# Patient Record
Sex: MALE | NOT HISPANIC OR LATINO | Employment: OTHER | URBAN - METROPOLITAN AREA
[De-identification: names, ages, dates, MRNs, and addresses within clinical notes are randomized per-mention and may not be internally consistent; named-entity substitution may affect disease eponyms.]

---

## 2017-01-24 ENCOUNTER — COMMUNICATION - HEALTHEAST (OUTPATIENT)
Dept: FAMILY MEDICINE | Facility: CLINIC | Age: 67
End: 2017-01-24

## 2017-01-24 DIAGNOSIS — C61 PROSTATE CANCER (H): ICD-10-CM

## 2017-02-01 ENCOUNTER — AMBULATORY - HEALTHEAST (OUTPATIENT)
Dept: LAB | Facility: CLINIC | Age: 67
End: 2017-02-01

## 2017-02-01 DIAGNOSIS — C61 PROSTATE CANCER (H): ICD-10-CM

## 2017-02-01 LAB — PSA SERPL-MCNC: 0.2 NG/ML (ref 0–4.5)

## 2017-02-02 ENCOUNTER — COMMUNICATION - HEALTHEAST (OUTPATIENT)
Dept: FAMILY MEDICINE | Facility: CLINIC | Age: 67
End: 2017-02-02

## 2017-02-02 ENCOUNTER — AMBULATORY - HEALTHEAST (OUTPATIENT)
Dept: FAMILY MEDICINE | Facility: CLINIC | Age: 67
End: 2017-02-02

## 2017-02-02 DIAGNOSIS — C61 PROSTATE CANCER (H): ICD-10-CM

## 2017-02-20 ENCOUNTER — AMBULATORY - HEALTHEAST (OUTPATIENT)
Dept: LAB | Facility: CLINIC | Age: 67
End: 2017-02-20

## 2017-02-20 DIAGNOSIS — C61 PROSTATE CANCER (H): ICD-10-CM

## 2017-02-20 LAB — PSA SERPL-MCNC: 0.2 NG/ML (ref 0–4.5)

## 2017-02-21 ENCOUNTER — COMMUNICATION - HEALTHEAST (OUTPATIENT)
Dept: FAMILY MEDICINE | Facility: CLINIC | Age: 67
End: 2017-02-21

## 2017-02-22 ENCOUNTER — OFFICE VISIT - HEALTHEAST (OUTPATIENT)
Dept: FAMILY MEDICINE | Facility: CLINIC | Age: 67
End: 2017-02-22

## 2017-02-22 DIAGNOSIS — R53.83 FATIGUE: ICD-10-CM

## 2017-02-22 DIAGNOSIS — G47.33 OSA ON CPAP: ICD-10-CM

## 2017-02-22 DIAGNOSIS — Z00.00 HEALTH CARE MAINTENANCE: ICD-10-CM

## 2017-02-23 ENCOUNTER — COMMUNICATION - HEALTHEAST (OUTPATIENT)
Dept: FAMILY MEDICINE | Facility: CLINIC | Age: 67
End: 2017-02-23

## 2017-03-13 ENCOUNTER — COMMUNICATION - HEALTHEAST (OUTPATIENT)
Dept: FAMILY MEDICINE | Facility: CLINIC | Age: 67
End: 2017-03-13

## 2017-03-13 ENCOUNTER — AMBULATORY - HEALTHEAST (OUTPATIENT)
Dept: FAMILY MEDICINE | Facility: CLINIC | Age: 67
End: 2017-03-13

## 2017-03-13 DIAGNOSIS — C61 PROSTATE CANCER (H): ICD-10-CM

## 2017-03-14 ENCOUNTER — AMBULATORY - HEALTHEAST (OUTPATIENT)
Dept: LAB | Facility: CLINIC | Age: 67
End: 2017-03-14

## 2017-03-14 DIAGNOSIS — C61 PROSTATE CANCER (H): ICD-10-CM

## 2017-03-14 LAB — PSA SERPL-MCNC: 0.2 NG/ML (ref 0–4.5)

## 2017-03-15 ENCOUNTER — COMMUNICATION - HEALTHEAST (OUTPATIENT)
Dept: FAMILY MEDICINE | Facility: CLINIC | Age: 67
End: 2017-03-15

## 2017-04-25 ENCOUNTER — COMMUNICATION - HEALTHEAST (OUTPATIENT)
Dept: FAMILY MEDICINE | Facility: CLINIC | Age: 67
End: 2017-04-25

## 2017-04-26 ENCOUNTER — AMBULATORY - HEALTHEAST (OUTPATIENT)
Dept: FAMILY MEDICINE | Facility: CLINIC | Age: 67
End: 2017-04-26

## 2017-04-26 DIAGNOSIS — C61 PROSTATE CANCER (H): ICD-10-CM

## 2017-05-01 ENCOUNTER — AMBULATORY - HEALTHEAST (OUTPATIENT)
Dept: LAB | Facility: CLINIC | Age: 67
End: 2017-05-01

## 2017-05-01 DIAGNOSIS — C61 PROSTATE CANCER (H): ICD-10-CM

## 2017-05-01 LAB — PSA SERPL-MCNC: 0.1 NG/ML (ref 0–4.5)

## 2017-05-02 ENCOUNTER — COMMUNICATION - HEALTHEAST (OUTPATIENT)
Dept: FAMILY MEDICINE | Facility: CLINIC | Age: 67
End: 2017-05-02

## 2017-05-03 ENCOUNTER — AMBULATORY - HEALTHEAST (OUTPATIENT)
Dept: FAMILY MEDICINE | Facility: CLINIC | Age: 67
End: 2017-05-03

## 2017-05-03 DIAGNOSIS — C61 PROSTATE CANCER (H): ICD-10-CM

## 2017-06-19 ENCOUNTER — AMBULATORY - HEALTHEAST (OUTPATIENT)
Dept: LAB | Facility: CLINIC | Age: 67
End: 2017-06-19

## 2017-06-19 DIAGNOSIS — C61 PROSTATE CANCER (H): ICD-10-CM

## 2017-06-19 LAB — PSA SERPL-MCNC: 0.1 NG/ML (ref 0–4.5)

## 2017-06-20 ENCOUNTER — COMMUNICATION - HEALTHEAST (OUTPATIENT)
Dept: FAMILY MEDICINE | Facility: CLINIC | Age: 67
End: 2017-06-20

## 2017-07-19 ENCOUNTER — OFFICE VISIT - HEALTHEAST (OUTPATIENT)
Dept: FAMILY MEDICINE | Facility: CLINIC | Age: 67
End: 2017-07-19

## 2017-07-19 ENCOUNTER — COMMUNICATION - HEALTHEAST (OUTPATIENT)
Dept: FAMILY MEDICINE | Facility: CLINIC | Age: 67
End: 2017-07-19

## 2017-07-19 DIAGNOSIS — R10.32 LLQ ABDOMINAL PAIN: ICD-10-CM

## 2017-07-19 DIAGNOSIS — K57.92 DIVERTICULITIS: ICD-10-CM

## 2017-07-19 DIAGNOSIS — C61 PROSTATE CANCER (H): ICD-10-CM

## 2017-07-19 LAB — PSA SERPL-MCNC: 0.1 NG/ML (ref 0–4.5)

## 2017-07-20 ENCOUNTER — COMMUNICATION - HEALTHEAST (OUTPATIENT)
Dept: FAMILY MEDICINE | Facility: CLINIC | Age: 67
End: 2017-07-20

## 2017-08-01 ENCOUNTER — COMMUNICATION - HEALTHEAST (OUTPATIENT)
Dept: FAMILY MEDICINE | Facility: CLINIC | Age: 67
End: 2017-08-01

## 2017-08-09 ENCOUNTER — OFFICE VISIT - HEALTHEAST (OUTPATIENT)
Dept: FAMILY MEDICINE | Facility: CLINIC | Age: 67
End: 2017-08-09

## 2017-08-09 DIAGNOSIS — Z01.818 PREOPERATIVE GENERAL PHYSICAL EXAMINATION: ICD-10-CM

## 2017-08-09 ASSESSMENT — MIFFLIN-ST. JEOR: SCORE: 1679.47

## 2017-08-28 ENCOUNTER — COMMUNICATION - HEALTHEAST (OUTPATIENT)
Dept: FAMILY MEDICINE | Facility: CLINIC | Age: 67
End: 2017-08-28

## 2017-08-28 ENCOUNTER — AMBULATORY - HEALTHEAST (OUTPATIENT)
Dept: LAB | Facility: CLINIC | Age: 67
End: 2017-08-28

## 2017-08-28 DIAGNOSIS — C61 PROSTATE CANCER (H): ICD-10-CM

## 2017-08-28 LAB — PSA SERPL-MCNC: 0.1 NG/ML (ref 0–4.5)

## 2017-10-09 ENCOUNTER — AMBULATORY - HEALTHEAST (OUTPATIENT)
Dept: LAB | Facility: CLINIC | Age: 67
End: 2017-10-09

## 2017-10-09 ENCOUNTER — COMMUNICATION - HEALTHEAST (OUTPATIENT)
Dept: FAMILY MEDICINE | Facility: CLINIC | Age: 67
End: 2017-10-09

## 2017-10-09 DIAGNOSIS — C61 PROSTATE CANCER (H): ICD-10-CM

## 2017-10-09 LAB — PSA SERPL-MCNC: 0.1 NG/ML (ref 0–4.5)

## 2017-10-13 ENCOUNTER — COMMUNICATION - HEALTHEAST (OUTPATIENT)
Dept: LAB | Facility: CLINIC | Age: 67
End: 2017-10-13

## 2017-12-29 ENCOUNTER — AMBULATORY - HEALTHEAST (OUTPATIENT)
Dept: LAB | Facility: CLINIC | Age: 67
End: 2017-12-29

## 2017-12-29 DIAGNOSIS — C61 PROSTATE CANCER (H): ICD-10-CM

## 2017-12-29 LAB — PSA SERPL-MCNC: <0.1 NG/ML (ref 0–4.5)

## 2017-12-30 ENCOUNTER — COMMUNICATION - HEALTHEAST (OUTPATIENT)
Dept: FAMILY MEDICINE | Facility: CLINIC | Age: 67
End: 2017-12-30

## 2018-01-29 ENCOUNTER — AMBULATORY - HEALTHEAST (OUTPATIENT)
Dept: FAMILY MEDICINE | Facility: CLINIC | Age: 68
End: 2018-01-29

## 2018-01-29 DIAGNOSIS — N52.9 ED (ERECTILE DYSFUNCTION): ICD-10-CM

## 2018-03-04 ENCOUNTER — AMBULATORY - HEALTHEAST (OUTPATIENT)
Dept: FAMILY MEDICINE | Facility: CLINIC | Age: 68
End: 2018-03-04

## 2018-03-04 ENCOUNTER — COMMUNICATION - HEALTHEAST (OUTPATIENT)
Dept: FAMILY MEDICINE | Facility: CLINIC | Age: 68
End: 2018-03-04

## 2018-03-04 DIAGNOSIS — N52.9 ED (ERECTILE DYSFUNCTION): ICD-10-CM

## 2018-03-19 ENCOUNTER — COMMUNICATION - HEALTHEAST (OUTPATIENT)
Dept: FAMILY MEDICINE | Facility: CLINIC | Age: 68
End: 2018-03-19

## 2018-03-19 DIAGNOSIS — N52.9 ED (ERECTILE DYSFUNCTION): ICD-10-CM

## 2018-03-21 ENCOUNTER — AMBULATORY - HEALTHEAST (OUTPATIENT)
Dept: LAB | Facility: CLINIC | Age: 68
End: 2018-03-21

## 2018-03-21 DIAGNOSIS — C61 PROSTATE CANCER (H): ICD-10-CM

## 2018-03-21 LAB — PSA SERPL-MCNC: <0.1 NG/ML (ref 0–4.5)

## 2018-03-22 ENCOUNTER — COMMUNICATION - HEALTHEAST (OUTPATIENT)
Dept: FAMILY MEDICINE | Facility: CLINIC | Age: 68
End: 2018-03-22

## 2018-06-25 ENCOUNTER — AMBULATORY - HEALTHEAST (OUTPATIENT)
Dept: LAB | Facility: CLINIC | Age: 68
End: 2018-06-25

## 2018-06-25 ENCOUNTER — COMMUNICATION - HEALTHEAST (OUTPATIENT)
Dept: FAMILY MEDICINE | Facility: CLINIC | Age: 68
End: 2018-06-25

## 2018-06-25 ENCOUNTER — AMBULATORY - HEALTHEAST (OUTPATIENT)
Dept: FAMILY MEDICINE | Facility: CLINIC | Age: 68
End: 2018-06-25

## 2018-06-25 DIAGNOSIS — C61 PROSTATE CANCER (H): ICD-10-CM

## 2018-06-25 LAB — PSA SERPL-MCNC: <0.1 NG/ML (ref 0–4.5)

## 2018-07-23 ENCOUNTER — RECORDS - HEALTHEAST (OUTPATIENT)
Dept: ADMINISTRATIVE | Facility: OTHER | Age: 68
End: 2018-07-23

## 2018-10-15 ENCOUNTER — AMBULATORY - HEALTHEAST (OUTPATIENT)
Dept: LAB | Facility: CLINIC | Age: 68
End: 2018-10-15

## 2018-10-15 DIAGNOSIS — C61 PROSTATE CANCER (H): ICD-10-CM

## 2018-10-15 LAB — PSA SERPL-MCNC: <0.1 NG/ML (ref 0–4.5)

## 2018-10-16 ENCOUNTER — COMMUNICATION - HEALTHEAST (OUTPATIENT)
Dept: FAMILY MEDICINE | Facility: CLINIC | Age: 68
End: 2018-10-16

## 2019-05-06 ENCOUNTER — HOSPITAL ENCOUNTER (OUTPATIENT)
Dept: LAB | Age: 69
Setting detail: SPECIMEN
Discharge: HOME | End: 2019-05-06

## 2019-05-06 ENCOUNTER — AMBULATORY - HEALTHEAST (OUTPATIENT)
Dept: LAB | Facility: CLINIC | Age: 69
End: 2019-05-06

## 2019-05-06 ENCOUNTER — COMMUNICATION - HEALTHEAST (OUTPATIENT)
Dept: FAMILY MEDICINE | Facility: CLINIC | Age: 69
End: 2019-05-06

## 2019-05-06 DIAGNOSIS — C61 PROSTATE CANCER (H): ICD-10-CM

## 2019-05-06 LAB — PSA SERPL-MCNC: <0.1 NG/ML (ref 0–4.5)

## 2019-06-03 ENCOUNTER — OFFICE VISIT - HEALTHEAST (OUTPATIENT)
Dept: FAMILY MEDICINE | Facility: CLINIC | Age: 69
End: 2019-06-03

## 2019-06-03 DIAGNOSIS — Z00.00 HEALTH CARE MAINTENANCE: ICD-10-CM

## 2019-06-03 DIAGNOSIS — K21.00 REFLUX ESOPHAGITIS: ICD-10-CM

## 2019-06-03 DIAGNOSIS — N52.9 ERECTILE DYSFUNCTION, UNSPECIFIED ERECTILE DYSFUNCTION TYPE: ICD-10-CM

## 2019-06-03 ASSESSMENT — MIFFLIN-ST. JEOR: SCORE: 1678.9

## 2019-06-10 ENCOUNTER — RECORDS - HEALTHEAST (OUTPATIENT)
Dept: ADMINISTRATIVE | Facility: OTHER | Age: 69
End: 2019-06-10

## 2019-06-18 ENCOUNTER — RECORDS - HEALTHEAST (OUTPATIENT)
Dept: ADMINISTRATIVE | Facility: OTHER | Age: 69
End: 2019-06-18

## 2019-10-14 ENCOUNTER — AMBULATORY - HEALTHEAST (OUTPATIENT)
Dept: FAMILY MEDICINE | Facility: CLINIC | Age: 69
End: 2019-10-14

## 2019-10-14 ENCOUNTER — AMBULATORY - HEALTHEAST (OUTPATIENT)
Dept: LAB | Facility: CLINIC | Age: 69
End: 2019-10-14

## 2019-10-14 ENCOUNTER — COMMUNICATION - HEALTHEAST (OUTPATIENT)
Dept: LAB | Facility: CLINIC | Age: 69
End: 2019-10-14

## 2019-10-14 DIAGNOSIS — C61 PROSTATE CANCER (H): ICD-10-CM

## 2019-10-14 DIAGNOSIS — Z12.5 SCREENING FOR PROSTATE CANCER: ICD-10-CM

## 2019-10-14 LAB — PSA SERPL-MCNC: <0.1 NG/ML (ref 0–4.5)

## 2019-10-16 ENCOUNTER — COMMUNICATION - HEALTHEAST (OUTPATIENT)
Dept: FAMILY MEDICINE | Facility: CLINIC | Age: 69
End: 2019-10-16

## 2020-06-03 ENCOUNTER — COMMUNICATION - HEALTHEAST (OUTPATIENT)
Dept: FAMILY MEDICINE | Facility: CLINIC | Age: 70
End: 2020-06-03

## 2020-06-03 DIAGNOSIS — K21.00 REFLUX ESOPHAGITIS: ICD-10-CM

## 2020-06-07 ENCOUNTER — COMMUNICATION - HEALTHEAST (OUTPATIENT)
Dept: FAMILY MEDICINE | Facility: CLINIC | Age: 70
End: 2020-06-07

## 2020-07-20 ENCOUNTER — AMBULATORY - HEALTHEAST (OUTPATIENT)
Dept: FAMILY MEDICINE | Facility: CLINIC | Age: 70
End: 2020-07-20

## 2020-07-20 DIAGNOSIS — G47.33 OSA ON CPAP: ICD-10-CM

## 2020-07-27 ENCOUNTER — COMMUNICATION - HEALTHEAST (OUTPATIENT)
Dept: FAMILY MEDICINE | Facility: CLINIC | Age: 70
End: 2020-07-27

## 2020-07-27 ENCOUNTER — AMBULATORY - HEALTHEAST (OUTPATIENT)
Dept: LAB | Facility: CLINIC | Age: 70
End: 2020-07-27

## 2020-07-27 DIAGNOSIS — C61 PROSTATE CANCER (H): ICD-10-CM

## 2020-07-27 LAB — PSA SERPL-MCNC: <0.1 NG/ML (ref 0–4.5)

## 2020-08-13 ENCOUNTER — COMMUNICATION - HEALTHEAST (OUTPATIENT)
Dept: FAMILY MEDICINE | Facility: CLINIC | Age: 70
End: 2020-08-13

## 2020-08-17 ENCOUNTER — COMMUNICATION - HEALTHEAST (OUTPATIENT)
Dept: SCHEDULING | Facility: CLINIC | Age: 70
End: 2020-08-17

## 2020-08-31 ENCOUNTER — OFFICE VISIT - HEALTHEAST (OUTPATIENT)
Dept: FAMILY MEDICINE | Facility: CLINIC | Age: 70
End: 2020-08-31

## 2020-08-31 DIAGNOSIS — G47.33 OSA ON CPAP: ICD-10-CM

## 2020-09-03 ENCOUNTER — COMMUNICATION - HEALTHEAST (OUTPATIENT)
Dept: FAMILY MEDICINE | Facility: CLINIC | Age: 70
End: 2020-09-03

## 2020-09-03 ENCOUNTER — AMBULATORY - HEALTHEAST (OUTPATIENT)
Dept: FAMILY MEDICINE | Facility: CLINIC | Age: 70
End: 2020-09-03

## 2020-09-03 DIAGNOSIS — N52.9 ERECTILE DYSFUNCTION, UNSPECIFIED ERECTILE DYSFUNCTION TYPE: ICD-10-CM

## 2020-10-15 ENCOUNTER — COMMUNICATION - HEALTHEAST (OUTPATIENT)
Dept: FAMILY MEDICINE | Facility: CLINIC | Age: 70
End: 2020-10-15

## 2020-10-15 DIAGNOSIS — G47.33 OSA ON CPAP: ICD-10-CM

## 2020-10-22 ENCOUNTER — RECORDS - HEALTHEAST (OUTPATIENT)
Dept: ADMINISTRATIVE | Facility: OTHER | Age: 70
End: 2020-10-22

## 2020-11-05 ENCOUNTER — AMBULATORY - HEALTHEAST (OUTPATIENT)
Dept: FAMILY MEDICINE | Facility: CLINIC | Age: 70
End: 2020-11-05

## 2020-11-05 DIAGNOSIS — R25.1 TREMOR OF RIGHT HAND: ICD-10-CM

## 2020-12-14 ENCOUNTER — RECORDS - HEALTHEAST (OUTPATIENT)
Dept: ADMINISTRATIVE | Facility: OTHER | Age: 70
End: 2020-12-14

## 2020-12-14 ENCOUNTER — VIRTUAL VISIT (OUTPATIENT)
Dept: NEUROLOGY | Facility: CLINIC | Age: 70
End: 2020-12-14
Payer: COMMERCIAL

## 2020-12-14 VITALS — HEIGHT: 70 IN | WEIGHT: 198 LBS | BODY MASS INDEX: 28.35 KG/M2

## 2020-12-14 DIAGNOSIS — G25.0 BENIGN ESSENTIAL TREMOR: Primary | ICD-10-CM

## 2020-12-14 DIAGNOSIS — I10 ESSENTIAL HYPERTENSION: ICD-10-CM

## 2020-12-14 PROCEDURE — 99204 OFFICE O/P NEW MOD 45 MIN: CPT | Mod: 95 | Performed by: PSYCHIATRY & NEUROLOGY

## 2020-12-14 SDOH — HEALTH STABILITY: MENTAL HEALTH: HOW OFTEN DO YOU HAVE A DRINK CONTAINING ALCOHOL?: 2-3 TIMES A WEEK

## 2020-12-14 SDOH — HEALTH STABILITY: MENTAL HEALTH: HOW OFTEN DO YOU HAVE 6 OR MORE DRINKS ON ONE OCCASION?: NOT ASKED

## 2020-12-14 SDOH — HEALTH STABILITY: MENTAL HEALTH: HOW MANY STANDARD DRINKS CONTAINING ALCOHOL DO YOU HAVE ON A TYPICAL DAY?: NOT ASKED

## 2020-12-14 ASSESSMENT — MIFFLIN-ST. JEOR: SCORE: 1669.37

## 2020-12-14 NOTE — LETTER
12/14/2020         RE: Zachariah Wilkerson Jr.  [address] [facility] [address]        Dear Colleague,    Thank you for referring your patient, Zachariah Wilkerson Jr., to the [facility] [place]. Please see a copy of my visit note below.        NEUROLOGY NOTE        Assessment/Plan            Essential tremor    Hypertension    Plan:  Follow-up in 6 months  Reduce stress.  Avoid sickness and sleeping well helpful to reduce the tremor.        This is a virtual visit due to COVID-19 Pandemic to mitigate potential disease spreading. Consent obtained for charge with this visit.  Chart reviewed.  Discussed about diagnosis and differentials.  Discussed about various factors or tracks to reduce tremors.  Discussed about treatment options including surgical interventions.  Total time spent about 30 minutes.       SUBJECTIVE       Zachariah Wilkerson Jr. is a 70 year old male seen for tremor.  Right handed.     Noted right hand tremor starting 2015, but in the last couple of years may have a couple of times at work when using a drill with right hand, between thumb and index finger there is significant tremor affects his performance hand have to cancel clinic that day.  Also noted tremor when writing. 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  Otherwise is doing well.    He has a drink of alcohol in the evening but no drug abuse or alcohol abuse.  Could not tell if tremor is affected or better since he does not have any tremor in the evening.    No family history of tremor.      Does not feel stressed or depressed.  But during the pandemic year there is increased stress.    Sleeping well in general.           Review of system     10 point system review otherwise unremarkable    PHYSICAL EXAMINATION     Vital signs in last 24 hours:  Vitals:    12/14/20 1421   Weight: 89.8 kg (198 lb)  "  Height: 1.778 m (5' 10\")       No acute distress.  Normal mental status, language and speech.  Very pleasant.  Cranial nerves II to XII intact.  Reports no focal weakness or sensory difficulty.  Bilateral hand tremor mild degree that is positional.  Very mild to degree.  Reports no difficulty with ambulation.  Hand coordination adequate.      Problem List     Patient Active Problem List    Diagnosis Date Noted     OA (osteoarthritis) of hip 09/05/2013     Priority: Medium         Past medical history     Past Surgical History:   Procedure Laterality Date     ARTHROPLASTY HIP       ARTHROPLASTY MINIMALLY INVASIVE HIP  9/5/2013    Procedure: ARTHROPLASTY MINIMALLY INVASIVE HIP;  Left Total Hip Arthroplasty Minimally Invasive Two Incision;  Surgeon: Justen Hooper MD;  Location: [facility] OR     SHOULDER SURGERY         Past Medical History:   Diagnosis Date     BPH (benign prostatic hypertrophy)      Diverticula, colon      DJD (degenerative joint disease) of hip      Gastro-oesophageal reflux disease      Glaucoma      Restless leg syndrome            Family history     No family history on file.      Social history     Social History     Socioeconomic History     Marital status:      Spouse name: Not on file     Number of children: Not on file     Years of education: Not on file     Highest education level: Not on file   Occupational History     Not on file   Social Needs     Financial resource strain: Not on file     Food insecurity     Worry: Not on file     Inability: Not on file     Transportation needs     Medical: Not on file     Non-medical: Not on file   Tobacco Use     Smoking status: Never Smoker     Smokeless tobacco: Never Used   Substance and Sexual Activity     Alcohol use: Yes     Frequency: 2-3 times a week     Comment: 6 per week     Drug use: No     Sexual activity: Not on file   Lifestyle     Physical activity     Days per week: Not on file     Minutes per session: Not on file     Stress: " Not on file   Relationships     Social connections     Talks on phone: Not on file     Gets together: Not on file     Attends Voodoo service: Not on file     Active member of club or organization: Not on file     Attends meetings of clubs or organizations: Not on file     Relationship status: Not on file     Intimate partner violence     Fear of current or ex partner: Not on file     Emotionally abused: Not on file     Physically abused: Not on file     Forced sexual activity: Not on file   Other Topics Concern     Not on file   Social History Narrative     Not on file         Allergy     Patient has no known allergies.    MEDICATIONS List     Current Outpatient Medications   Medication Sig Dispense Refill     LANsoprazole (PREVACID) 30 MG capsule Take 30 mg by mouth daily       latanoprost (XALATAN) 0.005 % ophthalmic solution Place 1 drop into both eyes At Bedtime             Rand Lorenz MD, MD, PhD  Neurology   Office tel: [phone]        This note was dictated using voice recognition software.  Any grammatical or context distortions are unintentional and inherent to the software. The note is tailored to serve physicians for communications among them, who knows what are the most important elements of history taken for disease diagnosis and differentials as well as management plans. Due to time factors, the notes are in general not reviewed before signing. Again due to time factor, follow-up notes often carries over old notes if they are relevant, so most clinic time is dedicated to interviewing with patients and caregivers, on clinical assessment, coordinating care and management.         Again, thank you for allowing me to participate in the care of your patient.        Sincerely,        Rand Lorenz MD

## 2020-12-14 NOTE — PROGRESS NOTES
"    NEUROLOGY NOTE        Assessment/Plan            Essential tremor    Hypertension    Plan:  Follow-up in 6 months  Reduce stress.  Avoid sickness and sleeping well helpful to reduce the tremor.        This is a virtual visit due to COVID-19 Pandemic to mitigate potential disease spreading. Consent obtained for charge with this visit.  Chart reviewed.  Discussed about diagnosis and differentials.  Discussed about various factors or tracks to reduce tremors.  Discussed about treatment options including surgical interventions.  Total time spent about 30 minutes.       SUBJECTIVE       Zachariah Wilkerson Jr. is a 70 year old male seen for tremor.  Right handed.     Noted right hand tremor starting 2015, but in the last couple of years may have a couple of times at work when using a drill with right hand, between thumb and index finger there is significant tremor affects his performance hand have to cancel clinic that day.  Also noted tremor when writing. 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  Otherwise is doing well.    He has a drink of alcohol in the evening but no drug abuse or alcohol abuse.  Could not tell if tremor is affected or better since he does not have any tremor in the evening.    No family history of tremor.      Does not feel stressed or depressed.  But during the pandemic year there is increased stress.    Sleeping well in general.           Review of system     10 point system review otherwise unremarkable    PHYSICAL EXAMINATION     Vital signs in last 24 hours:  Vitals:    12/14/20 1421   Weight: 89.8 kg (198 lb)   Height: 1.778 m (5' 10\")       No acute distress.  Normal mental status, language and speech.  Very pleasant.  Cranial nerves II to XII intact.  Reports no focal weakness or sensory difficulty.  Bilateral hand tremor mild degree that is positional.  Very mild to degree.  " Reports no difficulty with ambulation.  Hand coordination adequate.      Problem List     Patient Active Problem List    Diagnosis Date Noted     OA (osteoarthritis) of hip 09/05/2013     Priority: Medium         Past medical history     Past Surgical History:   Procedure Laterality Date     ARTHROPLASTY HIP       ARTHROPLASTY MINIMALLY INVASIVE HIP  9/5/2013    Procedure: ARTHROPLASTY MINIMALLY INVASIVE HIP;  Left Total Hip Arthroplasty Minimally Invasive Two Incision;  Surgeon: Justen Hooper MD;  Location: [facility]     SHOULDER SURGERY         Past Medical History:   Diagnosis Date     BPH (benign prostatic hypertrophy)      Diverticula, colon      DJD (degenerative joint disease) of hip      Gastro-oesophageal reflux disease      Glaucoma      Restless leg syndrome            Family history     No family history on file.      Social history     Social History     Socioeconomic History     Marital status:      Spouse name: Not on file     Number of children: Not on file     Years of education: Not on file     Highest education level: Not on file   Occupational History     Not on file   Social Needs     Financial resource strain: Not on file     Food insecurity     Worry: Not on file     Inability: Not on file     Transportation needs     Medical: Not on file     Non-medical: Not on file   Tobacco Use     Smoking status: Never Smoker     Smokeless tobacco: Never Used   Substance and Sexual Activity     Alcohol use: Yes     Frequency: 2-3 times a week     Comment: 6 per week     Drug use: No     Sexual activity: Not on file   Lifestyle     Physical activity     Days per week: Not on file     Minutes per session: Not on file     Stress: Not on file   Relationships     Social connections     Talks on phone: Not on file     Gets together: Not on file     Attends Moravian service: Not on file     Active member of club or organization: Not on file     Attends meetings of clubs or organizations: Not on file      Relationship status: Not on file     Intimate partner violence     Fear of current or ex partner: Not on file     Emotionally abused: Not on file     Physically abused: Not on file     Forced sexual activity: Not on file   Other Topics Concern     Not on file   Social History Narrative     Not on file         Allergy     Patient has no known allergies.    MEDICATIONS List     Current Outpatient Medications   Medication Sig Dispense Refill     LANsoprazole (PREVACID) 30 MG capsule Take 30 mg by mouth daily       latanoprost (XALATAN) 0.005 % ophthalmic solution Place 1 drop into both eyes At Bedtime             Rand Lorenz MD, MD, PhD  Neurology   Office tel: [phone]        This note was dictated using voice recognition software.  Any grammatical or context distortions are unintentional and inherent to the software. The note is tailored to serve physicians for communications among them, who knows what are the most important elements of history taken for disease diagnosis and differentials as well as management plans. Due to time factors, the notes are in general not reviewed before signing. Again due to time factor, follow-up notes often carries over old notes if they are relevant, so most clinic time is dedicated to interviewing with patients and caregivers, on clinical assessment, coordinating care and management.

## 2021-01-04 ENCOUNTER — AMBULATORY - HEALTHEAST (OUTPATIENT)
Dept: LAB | Facility: CLINIC | Age: 71
End: 2021-01-04

## 2021-01-04 ENCOUNTER — AMBULATORY - HEALTHEAST (OUTPATIENT)
Dept: FAMILY MEDICINE | Facility: CLINIC | Age: 71
End: 2021-01-04

## 2021-01-04 ENCOUNTER — COMMUNICATION - HEALTHEAST (OUTPATIENT)
Dept: LAB | Facility: CLINIC | Age: 71
End: 2021-01-04

## 2021-01-04 DIAGNOSIS — C61 PROSTATE CANCER (H): ICD-10-CM

## 2021-01-04 LAB — PSA SERPL-MCNC: <0.1 NG/ML (ref 0–4.5)

## 2021-01-05 ENCOUNTER — COMMUNICATION - HEALTHEAST (OUTPATIENT)
Dept: FAMILY MEDICINE | Facility: CLINIC | Age: 71
End: 2021-01-05

## 2021-01-15 ENCOUNTER — HEALTH MAINTENANCE LETTER (OUTPATIENT)
Age: 71
End: 2021-01-15

## 2021-03-19 ENCOUNTER — OFFICE VISIT - HEALTHEAST (OUTPATIENT)
Dept: FAMILY MEDICINE | Facility: CLINIC | Age: 71
End: 2021-03-19

## 2021-03-19 DIAGNOSIS — Z23 NEED FOR TETANUS BOOSTER: ICD-10-CM

## 2021-03-19 DIAGNOSIS — R03.0 ELEVATED BP WITHOUT DIAGNOSIS OF HYPERTENSION: ICD-10-CM

## 2021-03-19 DIAGNOSIS — Z23 NEED FOR SHINGLES VACCINE: ICD-10-CM

## 2021-03-19 DIAGNOSIS — Z20.822 EXPOSURE TO COVID-19 VIRUS: ICD-10-CM

## 2021-03-19 DIAGNOSIS — R05.9 COUGH: ICD-10-CM

## 2021-03-19 ASSESSMENT — MIFFLIN-ST. JEOR: SCORE: 1758.28

## 2021-03-20 ENCOUNTER — AMBULATORY - HEALTHEAST (OUTPATIENT)
Dept: FAMILY MEDICINE | Facility: CLINIC | Age: 71
End: 2021-03-20

## 2021-03-20 ENCOUNTER — COMMUNICATION - HEALTHEAST (OUTPATIENT)
Dept: FAMILY MEDICINE | Facility: CLINIC | Age: 71
End: 2021-03-20

## 2021-03-20 DIAGNOSIS — R05.9 COUGH: ICD-10-CM

## 2021-03-20 DIAGNOSIS — J84.10 PULMONARY FIBROSIS, UNSPECIFIED (H): ICD-10-CM

## 2021-03-22 ENCOUNTER — COMMUNICATION - HEALTHEAST (OUTPATIENT)
Dept: FAMILY MEDICINE | Facility: CLINIC | Age: 71
End: 2021-03-22

## 2021-03-22 ENCOUNTER — HOSPITAL ENCOUNTER (OUTPATIENT)
Dept: CT IMAGING | Facility: HOSPITAL | Age: 71
Discharge: HOME | End: 2021-03-22
Attending: FAMILY MEDICINE

## 2021-03-22 DIAGNOSIS — J84.10 PULMONARY FIBROSIS, UNSPECIFIED (H): ICD-10-CM

## 2021-03-22 DIAGNOSIS — R05.9 COUGH: ICD-10-CM

## 2021-03-23 ENCOUNTER — COMMUNICATION - HEALTHEAST (OUTPATIENT)
Dept: SCHEDULING | Facility: CLINIC | Age: 71
End: 2021-03-23

## 2021-05-29 NOTE — PROGRESS NOTES
Assessment:      Annual Wellness Visit    Encounter Diagnoses   Name Primary?     Erectile dysfunction, unspecified erectile dysfunction type Yes     Chronic Reflux Esophagitis      Health care maintenance          Plan:          Shingrix vaccine and repeat in 2 months.    Labs done by Dr Rutherford in Oct 2018    Recheck BP when you come in for Shingrix #2 in 2 months.      I have had an Advance Directives discussion with the patient.     The following high BMI interventions were performed this visit:.  Discussed continuing exercise and working on weight reduction.    The following health maintenance schedule was reviewed with the patient and provided in printed form in the after visit summary     Colonoscopy is current and due in 2026.    Shingrix vaccine as above    Continue to follow PSA's post prostatectomy per his Urologist recommendations.       Subjective:      Zachariah Wilkerson is a 68 y.o. male who presents for a Subsequent Annual Wellness Visit.      Healthy Habits:   Regular Exercise: Yes and tennis 2-3 times a week  Sunscreen Use: Yes  Healthy Diet: Yes  Dental Visits Regularly: Yes  Seat Belt: Yes  Sexually active: Yes  Monthly Self Testicular Exams:  N/A  Hemoccults: N/A  Flex Sig: N/A  Colonoscopy: Yes and 7-8-16  Lipid Profile: Yes  Glucose Screen: Yes  Prevention of Osteoporosis: Yes  Last Dexa: N/A  Guns at Home:   N/A      Immunization History   Administered Date(s) Administered     DT (pediatric) 04/01/2002     Hep B, Adult 03/06/2003, 07/07/2016     Hep B, historic 12/18/2014     Influenza high dose, seasonal 11/14/2016, 09/22/2017     Influenza, inj, historic,unspecified 10/29/2007, 10/06/2018     Influenza, seasonal,quad inj 6-35 mos 09/06/2011     Influenza,seasonal, Inj IIV3 10/19/2004, 11/07/2005, 12/28/2012     Pneumo Conj 13-V (2010&after) 07/07/2016, 02/22/2017     Pneumo Polysac 23-V 11/29/2011     Td, Adult, Absorbed 03/06/2003     Tdap 04/20/2006, 05/11/2011     ZOSTER, LIVE 07/07/2016      ZOSTER, RECOMBINANT, IM 06/03/2019     Immunization status: up to date and documented, Shingrix #1 today.    Current Outpatient Medications   Medication Sig Dispense Refill     latanoprost (XALATAN) 0.005 % ophthalmic solution   0     omeprazole (PRILOSEC) 20 MG capsule Take 2 capsules (40 mg total) by mouth daily before breakfast. 180 capsule 3     sildenafil (VIAGRA) 100 MG tablet Take 1/2-1 tablet 30 min to 4 hours prior to intercourse 10 tablet 6     No current facility-administered medications for this visit.      Past Medical History:   Diagnosis Date     Diverticulosis      GERD (gastroesophageal reflux disease)      Hyperlipidemia      Prostate cancer (H)      Past Surgical History:   Procedure Laterality Date     JOINT REPLACEMENT      both hips     KNEE ARTHROSCOPY       LAPAROSCOPIC RETROPUBIC PROSTATECTOMY  09/28/2016     ID TOTAL HIP ARTHROPLASTY      Description: Total Hip Replacement;  Recorded: 06/21/2010;  Comments: May 6, 2010     SHOULDER SURGERY      both shoulders     Patient has no known allergies.  Family History   Problem Relation Age of Onset     Dementia Mother      Stroke Father      Crohn's disease Father      Prostate cancer Father      Lung cancer Sister      No Medical Problems Son      Prostate cancer Paternal Uncle      No Medical Problems Sister      No Medical Problems Son      No Medical Problems Son      No Medical Problems Son      Prostate cancer Paternal Uncle      Social History     Socioeconomic History     Marital status:      Spouse name: Not on file     Number of children: Not on file     Years of education: Not on file     Highest education level: Not on file   Occupational History     Occupation: Dentist   Social Needs     Financial resource strain: Not on file     Food insecurity:     Worry: Not on file     Inability: Not on file     Transportation needs:     Medical: Not on file     Non-medical: Not on file   Tobacco Use     Smoking status: Never Smoker      "Smokeless tobacco: Never Used   Substance and Sexual Activity     Alcohol use: Yes     Comment: Occasional     Drug use: No     Sexual activity: Not on file   Lifestyle     Physical activity:     Days per week: Not on file     Minutes per session: Not on file     Stress: Not on file   Relationships     Social connections:     Talks on phone: Not on file     Gets together: Not on file     Attends Restoration service: Not on file     Active member of club or organization: Not on file     Attends meetings of clubs or organizations: Not on file     Relationship status: Not on file     Intimate partner violence:     Fear of current or ex partner: Not on file     Emotionally abused: Not on file     Physically abused: Not on file     Forced sexual activity: Not on file   Other Topics Concern     Not on file   Social History Narrative     Not on file       Current Diet:  well balanced diet  Amount Consumed Per Day  Na      Exercise Type: tennis  Exercise Frequency: 3 times a week    Mood Disorder and Cognitive Impairment Screenings  Anxiety Screening Tool:  na       Anxiety Screening Tool Score:  na  Depression Screening Tool:  PHQ-2    Depression Screening Tool Score:  0  Cognitive Impairment and Additional Screening:  No difficulty.  MINI-CO/5    Functional Ability/Level of Safety  Fall Risk Factors:  NONE  Home Safety Risk Factors: NA    Advanced Directive:  The patient has a living will.    Co-Managers and Medical Equipment/Suppliers:  Dr Waller, FP    Review of Systems  Review of Systems   backpain            Objective:     Vitals:    19 1043 19 1114   BP: 142/87 136/88   Pulse: 67    Resp: 10    Temp: (!) 96.4  F (35.8  C)    TempSrc: Oral    SpO2: 95%    Weight: 203 lb 9.6 oz (92.4 kg)    Height: 5' 9\" (1.753 m)      Body mass index is 30.07 kg/m .    GEN:  ALERT, ORIENTED TIMES THREE, NO APPARENT DISTRESS  HEENT:  TM'S NL                  PERRL                  THROAT CLEAR  NECK: SUPPLE WITHOUT " ADENOPATHY, THYROMEGALY OR CAROTID BRUIT  LUNGS:  CTA  COR:  RRR WITHOUT MURMUR  ABDOMEN: SOFT, POSITIVE BOWEL SOUNDS, NONTX WITHOUT MASS  :  Not examined  RECTAL:  S/p prostatectomy and followed with serial PSAs since hs surgery.  EXT: NO CYANOSIS, CLUBBING OR EDEMA  SKIN:  NO ATYPICAL APPEARING SKIN LESIONS           Lab Results   Component Value Date    PSA <0.1 05/06/2019    PSA <0.1 10/15/2018    PSA <0.1 06/25/2018

## 2021-05-29 NOTE — PATIENT INSTRUCTIONS - HE
Shingrix vaccine and repeat in 2 months.    Labs done by Dr Rutherford in Oct 2018    Recheck BP when you come in for Shingrix #2 in 2 months.

## 2021-05-30 VITALS — WEIGHT: 223.1 LBS | BODY MASS INDEX: 32.01 KG/M2

## 2021-05-31 ENCOUNTER — RECORDS - HEALTHEAST (OUTPATIENT)
Dept: ADMINISTRATIVE | Facility: CLINIC | Age: 71
End: 2021-05-31

## 2021-05-31 VITALS — WEIGHT: 204.6 LBS | BODY MASS INDEX: 30.3 KG/M2 | HEIGHT: 69 IN

## 2021-05-31 VITALS — BODY MASS INDEX: 29.8 KG/M2 | WEIGHT: 207.7 LBS

## 2021-06-02 VITALS — HEIGHT: 69 IN | WEIGHT: 203.6 LBS | BODY MASS INDEX: 30.16 KG/M2

## 2021-06-02 NOTE — TELEPHONE ENCOUNTER
Patient coming in today at 1345 for PSA draw. You were gone for the day so I am asking Dr. Garcia to place a one-time order. It's looks like you had a standing order placed in the past, can you place a standing PSA order so we have one for his next draw?    Thanks!

## 2021-06-04 VITALS
RESPIRATION RATE: 16 BRPM | DIASTOLIC BLOOD PRESSURE: 83 MMHG | SYSTOLIC BLOOD PRESSURE: 141 MMHG | TEMPERATURE: 97.6 F | HEART RATE: 71 BPM | WEIGHT: 217.4 LBS | BODY MASS INDEX: 32.1 KG/M2

## 2021-06-05 VITALS
HEART RATE: 72 BPM | BODY MASS INDEX: 31.15 KG/M2 | TEMPERATURE: 96.9 F | DIASTOLIC BLOOD PRESSURE: 87 MMHG | HEIGHT: 70 IN | WEIGHT: 217.6 LBS | OXYGEN SATURATION: 97 % | RESPIRATION RATE: 16 BRPM | SYSTOLIC BLOOD PRESSURE: 148 MMHG

## 2021-06-08 NOTE — TELEPHONE ENCOUNTER
RN cannot approve Refill Request    RN can NOT refill this medication PCP messaged that patient is overdue for Office Visit. Last office visit: 7/19/2017 Justen Waller MD Last Physical: 6/3/2019 Last MTM visit: Visit date not found Last visit same specialty: 7/19/2017 Justen Waller MD.  Next visit within 3 mo: Visit date not found  Next physical within 3 mo: Visit date not found      Esme Mcgraw, [facility] Triage/Med Refill 6/7/2020    Requested Prescriptions   Pending Prescriptions Disp Refills     omeprazole (PRILOSEC) 20 MG capsule [Pharmacy Med Name: OMEPRAZOLE 20MG CAPSULES] 180 capsule 3     Sig: TAKE 2 CAPSULES BY MOUTH DAILY BEFORE BREAKFAST       GI Medications Refill Protocol Failed - 6/3/2020  5:07 PM        Failed - PCP or prescribing provider visit in last 12 or next 3 months.     Last office visit with prescriber/PCP: 7/19/2017 Justen Waller MD OR same dept: Visit date not found OR same specialty: 7/19/2017 Justen Waller MD  Last physical: 6/3/2019 Last MTM visit: Visit date not found   Next visit within 3 mo: Visit date not found  Next physical within 3 mo: Visit date not found  Prescriber OR PCP: Justen Waller MD  Last diagnosis associated with med order: 1. Chronic Reflux Esophagitis  - omeprazole (PRILOSEC) 20 MG capsule [Pharmacy Med Name: OMEPRAZOLE 20MG CAPSULES]; TAKE 2 CAPSULES BY MOUTH DAILY BEFORE BREAKFAST  Dispense: 180 capsule; Refill: 3    If protocol passes may refill for 12 months if within 3 months of last provider visit (or a total of 15 months).

## 2021-06-09 NOTE — PROGRESS NOTES
HPI:  Onset last Aug in the right elbow. Doesn't hurt playing tennis.  Aches all the other times. About a month ago right wrist began to hurt.  Now recently the right ankle starting to hurt.   No swelling or redness.  A low grade ache can go on through the day. And touching it can make it intense.   May take two advil with two tylenol once daily.  Usually taken at night and will help sleep.  Energy level is way low.  Will be continuing close follow up with his Urologist in [place], s/p Robotic Prostatectomy.     GEN:   CV:   PULM:   GI:   :  GYN:     MS:   PSYCH:   DERM:   NEUR:       Current Outpatient Prescriptions on File Prior to Visit   Medication Sig Dispense Refill     lansoprazole (PREVACID) 30 MG capsule TAKE 1 CAPSULE BY MOUTH TWICE DAILY 180 capsule 0     No current facility-administered medications on file prior to visit.        Pmh: reviewed  Psh: reviewed  Allergy:  reviewed      EXAM:    Visit Vitals     /78 (Patient Site: Right Arm, Patient Position: Sitting, Cuff Size: Adult Large)     Pulse 86     Temp 98.3  F (36.8  C) (Oral)     Resp 18     Wt (!) 223 lb 1.6 oz (101.2 kg)     BMI 32.01 kg/m2     GEN:   ALERT, NAD, ORIENTED TIMES THREE  LUNGS:  CTA  COR: RRR WITHOUT MURMUR; NO CAROTID BRUIT  EXT:  NO SWELLING OR EDEMA  MS: RIGHT ELBOW WITH FULL ROM WITHOUT SWELLING, SL FOCAL LATERAL OLECRANON TX         RIGHT WRIST WITH FULL ROM WITHOUT REDNESS OR SWELLING, SLIGHT FOCAL LATERAL TX         RIGHT ANKLE WITH SLIGHT SOHAIL-MALLEOLAR TX LATERALLY WITHOUT SWELLING, REDNESS OR WARMTH.         Recent Results (from the past 168 hour(s))   PSA (Prostatic-Specific Antigen), Diagnostic    Collection Time: 02/20/17  1:37 PM   Result Value Ref Range    PSA 0.2 0.0 - 4.5 ng/mL       DIAGNOSIS:  1. Fatigue  Thyroid Cascade   2. LETICIA on CPAP  Rheumatoid Factor Quant    Sedimentation Rate    Lyme Antibody [place]    Uric Acid   3. Health care maintenance  Pneumococcal conjugate vaccine 13-valent  6wks-17yrs; >50yrs       PLAN:  Patient Instructions   Prevnar 13    labs

## 2021-06-10 NOTE — TELEPHONE ENCOUNTER
Patient Returning Call  Reason for call:  Returning phone call.  Information relayed to patient:  Below message relayed to patient.  Patient has additional questions:  Yes  If YES, what are your questions/concerns:  Patient stated he had a sleep study completed at [facility] in 2016. Patient is requesting clinic staff reach out to [facility] and obtain records. Patient also stated he would like to know once the records have been received, to see if he will still need to schedule an appointment. Please advise.  Okay to leave a detailed message?: No

## 2021-06-10 NOTE — TELEPHONE ENCOUNTER
Left message to call back for: Appointment   Information to relay to patient:  SABINE, I called [facility] spoke with Rafael in medical records and he will fax the sleep study results. Unfortunately will still need an office visit to document use and benefits of using the CPAP device since it has been over a year since last seen and was not documented at that time.

## 2021-06-10 NOTE — TELEPHONE ENCOUNTER
Duplicate encounter.   RN placed note in 8/13/20 ongoing TE regarding same request.     Olinda Verduzco RN 08/17/20 2:54 PM  [facility] [place] Nurse Advisor

## 2021-06-10 NOTE — TELEPHONE ENCOUNTER
Left message for Zachariah to call back. Please help pt schedule office visit asap so we can get all the information over to [facility]

## 2021-06-10 NOTE — TELEPHONE ENCOUNTER
Left message to call back for: Appointment   Information to relay to patient: Please see messages below and help pt schedule appt       Second attempt

## 2021-06-10 NOTE — TELEPHONE ENCOUNTER
Erik calling regarding CPAP ordered by Dr. Waller on 7/20/20.  They need insurance information, sleep study information and most recent office appointment with provider faxed to [phone] in order to process through insurance.    Please contact Erik with update on records at [phone] ext [phone].    Olinda Verduzco RN 08/17/20 2:50 PM  [facility] Nurse Advisor

## 2021-06-10 NOTE — TELEPHONE ENCOUNTER
Left message for Erik letting her know that we still need to see Zachariah in clinic but have not been able to get ahold of him yet to schedule. Will call again today to see if we can schedule him.

## 2021-06-10 NOTE — TELEPHONE ENCOUNTER
Scanned document states they are looking for chart notes documenting use and benefits from CPAP. Also requesting copy of sleep study.    Last appointment was over 1 year ago and does not have any documentation of the CPAP use. Will need to schedule a video visit that would count as a face to face or a in clinic appointment.   When was the last sleep study done? Where did he go for sleep study?  I do not see these results in chart.

## 2021-06-10 NOTE — TELEPHONE ENCOUNTER
Patient Returning Call  Reason for call:  Patient calling back  Information relayed to patient:  Relayed below message  Patient has additional questions:  No  If YES, what are your questions/concerns:  Transferred patient to scheduling to make an appointment  Okay to leave a detailed message?: No call back needed

## 2021-06-10 NOTE — TELEPHONE ENCOUNTER
Who is calling:  Little   Reason for Call:  Caller stated she is calling to check on the status of the request that was scanned on 7/28/2020. Caller stated she would like a call back with update if requested information was fax. Caller stated to fax information requested to fax number [phone].  Date of last appointment with primary care: n/a  Okay to leave a detailed message: Yes  [phone]

## 2021-06-11 NOTE — PROGRESS NOTES
DIAGNOSIS:  1. LLQ abdominal pain  Urinalysis-UC if Indicated    White Blood Count (WBC)   2. Diverticulitis  ciprofloxacin HCl (CIPRO) 500 MG tablet    metroNIDAZOLE (FLAGYL) 500 MG tablet    HYDROcodone-acetaminophen 5-325 mg per tablet   3. Prostate cancer  PSA, Diagnostic (Prostatic-Specific Antigen)       PLAN:  Patient Instructions   cipro 500 mg twice daily for 10 days    Metronidazole 500 mg three times daily for 10 days    Probiotic while on the antibiotics    No alcohol while on metronidazole    To ER if symptoms are increasing.            HPI:   Awoke with severe pain in the left abdomen, achy 2-3 up to 5 sitting.  Pushing can be severe.   No blood in the urine.  Normal BM this am.  No blood in urine, no urgency, or frequency.   no fever or chills.  Appetite is normal     Colonoscopy in 2008 revealed diverticuli thoughout the entire colon.          Current Outpatient Prescriptions on File Prior to Visit   Medication Sig Dispense Refill     CIALIS 5 mg tablet TAKE 1 TABLET BY MOUTH DAILY  5     lansoprazole (PREVACID) 30 MG capsule TAKE 1 CAPSULE BY MOUTH TWICE DAILY 180 capsule 0     latanoprost (XALATAN) 0.005 % ophthalmic solution   0     No current facility-administered medications on file prior to visit.        Pmh: reviewed  Psh: reviewed  Allergy:  reviewed      EXAM:    /84 (Patient Site: Right Arm, Patient Position: Sitting, Cuff Size: Adult Regular)  Pulse 72  Temp 97.9  F (36.6  C) (Oral)   Resp 14  Wt 207 lb 11.2 oz (94.2 kg)  BMI 29.8 kg/m2  GEN:   ALERT, NAD, ORIENTED TIMES THREE  LUNGS: CTA  COR: RRR WITHOUT MURMUR  ABD: SOFT, +BS, moderate LLQ TX, WITHOUT GUARDING OR PERITONEAL SIGNS  SKIN: NO RASH , ULCERS OR LESIONS NOTED  EXT: WITHOUT EDEMA OR SWELLING    Recent Results (from the past 168 hour(s))   Urinalysis-UC if Indicated    Collection Time: 07/19/17 12:38 PM   Result Value Ref Range    Color, UA Yellow Colorless, Yellow, Straw, Light Yellow    Clarity, UA Clear Clear     Glucose, UA Negative Negative    Bilirubin, UA Negative Negative    Ketones, UA 80 mg/dL (!) Negative    Specific Gravity, UA 1.015 1.005 - 1.030    Blood, UA Negative Negative    pH, UA 5.0 5.0 - 8.0    Protein, UA Negative Negative mg/dL    Urobilinogen, UA 0.2 E.U./dL 0.2 E.U./dL, 1.0 E.U./dL    Nitrite, UA Negative Negative    Leukocytes, UA Negative Negative   White Blood Count (WBC)    Collection Time: 07/19/17 12:38 PM   Result Value Ref Range    WBC 8.8 4.0 - 11.0 thou/uL

## 2021-06-11 NOTE — PROGRESS NOTES
DIAGNOSIS:  1. LETICIA on CPAP         PLAN:     Continue CPAP at present settings    Will fax visit notes and sleep study to [facility]            HPI:  Pt with a h/o of LETICIA needing repair of equipment.  A gasket is leaking water and needs repair.  Had a sleep study on 7-171-16 at [facility] and and sxs were eliminated at 9 cm of water.  The CPAP continues to function well at current settings with the result being good sleep and no morning fatigue.             Current Outpatient Medications on File Prior to Visit   Medication Sig Dispense Refill     latanoprost (XALATAN) 0.005 % ophthalmic solution   0     omeprazole (PRILOSEC) 20 MG capsule TAKE 2 CAPSULES BY MOUTH DAILY BEFORE BREAKFAST 180 capsule 3     sildenafil (VIAGRA) 100 MG tablet Take 1/2-1 tablet 30 min to 4 hours prior to intercourse 10 tablet 6     No current facility-administered medications on file prior to visit.        Pmh: reviewed  Psh: reviewed  Allergy:  reviewed      EXAM:    /83   Pulse 71   Temp 97.6  F (36.4  C) (Oral)   Resp 16   Wt 217 lb 6.4 oz (98.6 kg)   BMI 32.10 kg/m    GEN:   ALERT, NAD, ORIENTED TIMES THREE  NECK: SUPPLE WITHOUT ADENOPATHY OR THYROMEGALY  LUNGS: CTA  COR: RRR WITHOUT MURMUR  EXT: WITHOUT EDEMA/SWELLING    No results found for this or any previous visit (from the past 168 hour(s)).

## 2021-06-12 NOTE — TELEPHONE ENCOUNTER
"I signed but was unable to put \"print only\".   Looks like it gets sent to pharmacy.  Hopefully you can still print signed order and then fax to [facility]  "

## 2021-06-12 NOTE — PROGRESS NOTES
Assessment/Plan:      Visit for Preoperative Exam.     66-year-old male patient with a past medical history of cataracts, prostate cancer status post surgical removal, GERD, LETICIA well-controlled on CPAP is here for preoperative examination prior to undergoing bilateral cataract surgery.  Patient has been evaluated by an ophthalmologist.  Patient appears well today and examination.  I reviewed past medical history, past surgical history, allergies, medications, social history and updated his discharge.  Patient has no cardiopulmonary condition.  He has no chest pain or shortness of breath with strenuous physical activity.  His activity level is at least 9 METS and his revised cardiac risk index is 0.04%, putting him at very low risk for any cardiac complication.  He has no prior anesthesia reaction.  He has no history of bleeding or clotting disorder.  He has no allergy.  Laboratory workup is not indicated.  EKG is not indicated.  Patient is approved for surgery with anesthesia. Recommend patient not taking any NSAID-containing product for about 5 days prior to surgery as well as holding Cialis the day before surgery.  Copy of the pre-op was given to the patient to bring along on the day of surgery. Follow up as needed. Proceed with proposed surgery without additional clinical clarifications.     Subjective:     Scheduled Procedure: Bilateral cataract surgery  Surgery Date: 8/14/2017 for the right eye, 8/31/2017 for the left eye  Surgery Location: [facility]  Surgeon:  Dr. Sparks    Current Outpatient Prescriptions   Medication Sig Dispense Refill     CIALIS 5 mg tablet TAKE 1 TABLET BY MOUTH DAILY  5     lansoprazole (PREVACID) 30 MG capsule TAKE 1 CAPSULE BY MOUTH TWICE DAILY 180 capsule 0     latanoprost (XALATAN) 0.005 % ophthalmic solution   0     No current facility-administered medications for this visit.        No Known Allergies    Immunization History   Administered Date(s) Administered     DT  (pediatric) 04/01/2002     Influenza high dose, seasonal 11/14/2016     Influenza, inj, historic 10/29/2007     Influenza, seasonal,quad inj 6-35 mos 09/06/2011     Pneumo Conj 13-V (2010&after) 02/22/2017     Pneumo Polysac 23-V 11/29/2011     Tdap 05/11/2011       Patient Active Problem List   Diagnosis     Chronic Reflux Esophagitis     Benign Prostatic Hypertrophy     Hiatal Hernia     Diverticulosis     Diverticulitis Of Colon     Acute Posthemorrhagic Anemia     Hepatic Cyst     Scrotal Varicocele     Prostate cancer     LETICIA on CPAP       Past Medical History:   Diagnosis Date     Diverticulosis      GERD (gastroesophageal reflux disease)      Hyperlipidemia      Prostate cancer        Social History     Social History     Marital status:      Spouse name: N/A     Number of children: N/A     Years of education: N/A     Occupational History     Dentist      Social History Main Topics     Smoking status: Never Smoker     Smokeless tobacco: Never Used     Alcohol use No     Drug use: No     Sexual activity: Not on file     Other Topics Concern     Not on file     Social History Narrative   Is a dentist in practice for about 40 years. Is  for 33 years.     Past Surgical History:   Procedure Laterality Date     JOINT REPLACEMENT       KNEE ARTHROSCOPY       LAPAROSCOPIC RETROPUBIC PROSTATECTOMY  09/28/2016     TX TOTAL HIP ARTHROPLASTY      Description: Total Hip Replacement;  Recorded: 06/21/2010;  Comments: May 6, 2010       History of Present Illness  Chief Complaint   Patient presents with     Pre-op Exam     Cataract Surgery Right Eye-8/14/17 Left Eye-8/31/17 [facility] Fax: [phone] Dr. Bridger Wilkerson is a 66 y.o. year old with bilateral cataracts, requiring surgical repair.  He has been evaluated by a surgeon.  He will have his left eye performed first followed by the right eye.    Recent Health  Fever/chills: no  Chest Pain: no. PLAYS TENNIS 2 TIMES A WEEK FOR 2  "HOURS.  Dyspnea: no  Nausea/vomiting: no  Diarrhea: no  Abdominal Pain: no  Easy Bruising: no  Lower Extremity Swelling: no  Poor Exercise Tolerance: no    Most recent Health Maintenance Visit:  1 year(s) ago performed at the [facility].     Pertinent History  Prior Anesthesia: yes  Previous Anesthesia Reaction:  no  Diabetes: no  Cardiovascular Disease: no  Pulmonary Disease: no  Renal Disease: no  GI Disease: yes, GERD well controlled on Prevacid.   Sleep Apnea: LETICIA on CPAP. Will be back to see [facility] in 9/2017.   Thromboembolic Problems/Bleeding problems: no  Impaired Immunity: no  Steroid use in the last 6 months: no  Frequent Aspirin use: no    No family history of anesthesia reaction, sudden death, clotting disorder and bleeding disorder    Social history of patient does not wear denture or partial plates, there is no transfusion refusal, NSAID use and there are no concerns regarding care after surgery    After surgery, the patient plans to recover at home alone.    Review of Systems  A 12 point comprehensive review of systems was negative except as noted.          Objective:         Vitals:    08/09/17 1521   BP: 118/68   Pulse: 74   Resp: 16   Temp: 97.6  F (36.4  C)   TempSrc: Oral   Weight: 204 lb 9.6 oz (92.8 kg)   Height: 5' 8.75\" (1.746 m)       Physical Exam:  General Appearance: Alert, cooperative, no distress, appears stated age  Head: Normocephalic, without obvious abnormality, atraumatic  Eyes: conjunctiva/corneas clear, EOM's intact  Ears: Normal TM's and external ear canals, both ears  Nose: Nares normal, septum midline,mucosa normal, no drainage  Throat: Lips, mucosa, and tongue normal;   Neck: Supple, symmetrical, trachea midline, no adenopathy;  thyroid: not enlarged, symmetric, no tenderness/mass/nodules  Lungs: Clear to auscultation bilaterally, respirations unlabored  Heart: Regular rate and rhythm, S1 and S2 normal, no murmur, rub, or gallop,  Abdomen: Soft, non-tender, bowel sounds " active all four quadrants,  no masses, no organomegaly  Musculoskeletal: Normal range of motion.  Mild trace edema of the ankles and lower part of bilateral shins.   Extremities: Extremities normal, atraumatic, no cyanosis or edema  Skin: Skin color, texture, turgor normal, no rashes or lesions  Lymph nodes: Cervical, supraclavicular nodes normal  Neurologic: He is alert.  No neurological deficitsk  Psychiatric: He has a normal mood and affect.

## 2021-06-12 NOTE — TELEPHONE ENCOUNTER
Who is calling:  [facility].  Reason for Call:  Needing physician to sign off on an order for full face CPAP mask.  Date of last appointment with primary care: 8/31/2020  Okay to leave a detailed message: Yes    Please fax signed order to:  [phone]

## 2021-06-14 NOTE — TELEPHONE ENCOUNTER
Need lab orders ASAP - pt is coming for lab only apt this morning / there are no lab orders in his chart. Please review his plan and put lab orders in his chart.  Thx

## 2021-06-16 NOTE — PROGRESS NOTES
DIAGNOSIS:  1. Cough, trial of omeprazole 20 mg daily for possible minimal symptomatic GERD. XR Chest 2 Views   2. 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 Need for shingles vaccine  Varicella Zoster, Recombinant Vaccine IM   4. Need for tetanus booster  Td, Preservative Free (green label)   5.      Elevated BP without a dx of HTN.    PLAN:     Shingrix #2    Tetanus update (Td)    CXR read (no acute findings) and reviewed with pt.  (Subsequent final read with slight fibrosis in the left lung base unchanged)    Recommend trial of omeprazole 20 mg daily and follow up if cough/phlegm in throat are not resolving.     Health maint reviewed. Will update immunizations.    Will request copy of [place] [facility] report.    Recommend BP rechecks be done at the pt's office.   Follow up if elevated/      HPI:     Cough going on for 5-6 months.  On 2nd round of Zithromax but it is helping.  Sister with lung cancer at 59 (light smoker in her early 20s)  Exposure to 2nd hane smoke up until age 22.  Non-smoker now.  No shortness of breath.  Weight stable.  Appetite is fine.  Gets prn GERD and uses prn omeprazole.  Has not been an allergy sufferer.  Had sxs earlier in the year c/w Covid           Current Outpatient Medications on File Prior to Visit   Medication Sig Dispense Refill     latanoprost (XALATAN) 0.005 % ophthalmic solution   0     omeprazole (PRILOSEC) 20 MG capsule TAKE 2 CAPSULES BY MOUTH DAILY BEFORE BREAKFAST 180 capsule 3     sildenafiL (VIAGRA) 100 MG tablet Take 1/2-1 tablet 30 min to 4 hours prior to intercourse 30 tablet 6     timoloL maleate (TIMOPTIC) 0.5 % ophthalmic solution PLACE 1 DROP IN BOTH EYES EVERY MORNING       No current facility-administered medications on file prior to visit.        Pmh: reviewed  Psh: reviewed  Allergy:  reviewed      EXAM:    /87   Pulse 72   Temp 96.9  F (36.1  C)  (Oral)   Resp 16   Wt 217 lb 9.6 oz (98.7 kg)   SpO2 97%   BMI 32.13 kg/m    GEN:   ALERT, NAD, ORIENTED TIMES THREE  NECK: SUPPLE WITHOUT ADENOPATHY OR THYROMEGALY  LUNGS: CTA  COR: RRR WITHOUT MURMUR  SKIN: NO RASH , ULCERS OR LESIONS NOTED  EXT: WITHOUT EDEMA/SWELLING    Reviewed  Result Note  View in In Basket   XR Chest 2 Views (Order [id number])  Imaging  Date: 3/19/2021 Department: [facility] Ordering/Authorizing: Justen Waller MD   Study Result    EXAM: XR CHEST 2 VIEWS  LOCATION: [facility]  DATE/TIME: 3/19/2021 9:29 AM     INDICATION: Cough  COMPARISON: 08/25/2017     IMPRESSION:   Slight fibrosis left lung base unchanged. No acute new findings.         No results found for this or any previous visit (from the past 168 hour(s)).  Lab Results   Component Value Date    PSA <0.1 01/04/2021    PSA <0.1 07/27/2020    PSA <0.1 10/14/2019     CXR: PA AND LAT NORMAL

## 2021-06-19 NOTE — LETTER
Letter by Justen Waller MD at      Author: Justen Waller MD Service: -- Author Type: --    Filed:  Encounter Date: 5/6/2019 Status: (Other)         Zachariah Wilkerson  [address] [facility] [address]             May 6, 2019         Dear Mr. Wilkerson,    Below are the results from your recent visit:    Resulted Orders   PSA, Diagnostic (Prostatic-Specific Antigen)   Result Value Ref Range    PSA <0.1 0.0 - 4.5 ng/mL    Narrative    Method is Abbott Prostate-Specific Antigen (PSA)  Standard-WHO 1st International (90:10)       Hi [place]:  The PSA looks great!  Have a great week!  Justen    Please call with questions or contact us using [employer].    Sincerely,        Electronically signed by Justen Waller MD

## 2021-06-19 NOTE — LETTER
Letter by Justen Waller MD at      Author: Justen Waller MD Service: -- Author Type: --    Filed:  Encounter Date: 10/16/2019 Status: Signed         Zachariah Pinto [facility] [address]             October 16, 2019         Dear Mr. Wilkerson,    Below are the results from your recent visit:    Resulted Orders   PSA, Annual Screen (Prostatic-Specific Antigen)   Result Value Ref Range    PSA <0.1 0.0 - 4.5 ng/mL    Narrative    Method is Abbott Prostate-Specific Antigen (PSA)  Standard-WHO 1st International (90:10)       Hi [place]:  Your PSA remains undetectable!  I put in a standing order for every 6 months.  Let me know  if that is sufficient.  Hope all is well with you and family.  Justen    Please call with questions or contact us using [employer].    Sincerely,        Electronically signed by Justen Waller MD

## 2021-06-20 NOTE — LETTER
Letter by Justen Waller MD at      Author: Justen Waller MD Service: -- Author Type: --    Filed:  Encounter Date: 7/27/2020 Status: (Other)         Zachariah Pinto [facility] [address]             July 27, 2020         Dear Mr. Wilkerson,    Below are the results from your recent visit:    Resulted Orders   PSA, Diagnostic (Prostatic-Specific Antigen)   Result Value Ref Range    PSA <0.1 0.0 - 4.5 ng/mL    Narrative    Method is Abbott Prostate-Specific Antigen (PSA)  Standard-WHO 1st International (90:10)       Hi [place]:  The PSA remains <0.1 so looks great!  If you're having any problems getting the CPAP fixed let me know and we can refer you locally if need be.    Please call with questions or contact us using [employer]cert.    Sincerely,        Electronically signed by Justen Waller MD

## 2021-06-21 NOTE — LETTER
Letter by Justen Waller MD at      Author: Justen Waller MD Service: -- Author Type: --    Filed:  Encounter Date: 1/5/2021 Status: (Other)         Zachariah Wilkerson  [address] [facility] [address]             January 5, 2021         Dear Mr. Wilkerson,    Below are the results from your recent visit:    Resulted Orders   PSA, Diagnostic (Prostatic-Specific Antigen)   Result Value Ref Range    PSA <0.1 0.0 - 4.5 ng/mL    Narrative    Method is Abbott Prostate-Specific Antigen (PSA)  Standard-WHO 1st International (90:10)       Hi [place]:  The PSA looks great and remains <0.1.  I have a standing order in your record for diagnositic PSA's every 6 months.  Have a great day!  Justen    Please call with questions or contact us using [employer].    Sincerely,        Electronically signed by Justen Waller MD

## 2021-06-21 NOTE — LETTER
Letter by Justen Waller MD at      Author: Justen Waller MD Service: -- Author Type: --    Filed:  Encounter Date: 3/22/2021 Status: (Other)         Zachariah Pinto [facility] [address]             March 22, 2021         Dear Mr. Wilkerson,    Below are the results from your recent visit:    Resulted Orders   CT Chest Without Contrast    Narrative    EXAM: CT CHEST WO CONTRAST  LOCATION: [facility]  DATE/TIME: 3/22/2021 10:23 AM    INDICATION: Cough Pulmonary fibrosis  COMPARISON: CT pulmonary angiogram 12/01/2011  TECHNIQUE: CT chest without IV contrast. Multiplanar reformats were obtained. Dose reduction techniques were used.  CONTRAST: None.    FINDINGS:   LUNGS AND PLEURA: Symmetric lung inflation. No subpleural or peribronchial reticular opacities to suggest lung fibrosis. No bronchiectasis or bronchiolectasis. No stacked subpleural cysts/honeycombing. A subsegmental focus of groundglass attenuation is   present in the right apex which most likely represents a small focus of lung inflammation but is of doubtful clinical significance. No other groundglass or consolidative airspace opacities are present. No pleural fluid, thickening, or nodularity.    MEDIASTINUM: Cardiac chambers are normal in size. No pericardial effusion. Normal caliber main pulmonary artery and thoracic aorta. Conventional arch anatomy. Imaged thyroid gland is normal. No enlarged mediastinal or hilar lymph nodes. Small hiatal   hernia. Esophagus is decompressed.    CORONARY ARTERY CALCIFICATION: Mild.    UPPER ABDOMEN: Benign cyst in the central liver measures 6.5 cm in is larger compared to 2011. Multiple diverticula of the transverse and proximal descending colon but no bowel wall thickening or pericolonic inflammation.    MUSCULOSKELETAL: Small degenerative osteophytes at the thoracolumbar junction. Developmental pseudoarthrosis between the right posterior fifth and sixth ribs  "(series 400.2, image 119). No fractures or aggressive bone lesions. Chest wall soft tissues are   symmetric.      Impression    1.  There are no findings to suggest a fibrosing lung disorder. Solitary subsegmental focus of groundglass attenuation in the right apex likely represents focal inflammation, but does not require specific dedicated imaging workup or follow-up.  2.  No hiatal hernia.  3.  Diverticulosis of the colon.         Hi Cornell:  As discussed the findings on your CT are benign.  Specifically there is no indication of a \"fibrosing lung disorder\".    Please call with questions or contact us using [employer].    Sincerely,        Electronically signed by Justen Waller MD       "

## 2021-06-21 NOTE — LETTER
Letter by Justen Waller MD at      Author: Justen Waller MD Service: -- Author Type: --    Filed:  Encounter Date: 3/20/2021 Status: (Other)         Zachariah Wilkerson  [address] [facility] [address]             March 20, 2021         Dear Mr. Wilkerson,    Below are the results from your recent visit:    Resulted Orders   XR Chest 2 Views    Narrative    EXAM: XR CHEST 2 VIEWS  LOCATION: [facility]  DATE/TIME: 3/19/2021 9:29 AM    INDICATION: Cough  COMPARISON: 08/25/2017      Impression    Slight fibrosis left lung base unchanged. No acute new findings.       Hi Cornell:  Per our discussion I have ordered a follow up CT Chest without contrast in follow up of the slight fibrosis noted on the chest x-ray.    Please call with questions or contact us using [employer].    Sincerely,        Electronically signed by Justen Waller MD

## 2021-06-21 NOTE — LETTER
Letter by Justen Waller MD at      Author: Justen Waller MD Service: -- Author Type: --    Filed:  Encounter Date: 3/22/2021 Status: (Other)         Zachariah Pinto [facility] [address]             March 22, 2021         Dear Mr. Wilkerson,    Below are the results from your recent visit:    Resulted Orders   COVID-19 Virus (Coronavirus) Antibody & Titer Reflex   Result Value Ref Range    COVID-19 Antibody Screen Negative       Comment:      No COVID-19 antibodies detected. Patients with recent COVID-19 vaccination or  recent symptom onset from COVID-19 infection may not produce sufficient levels  of detectable antibodies. Immunocompromised COVID-19 patients may take longer  to develop antibodies.    COVID-19 IgG Titer Not Applicable       Comment:      Qualitative screen for IgG, IgM and IgA antibodies to COVID-19 (SARS-CoV-2)  spike receptor binding domain (RBD) protein. COVID-19 spike RBD antibodies may  be elevated due to vaccination, or a past or current COVID-19 infection.  The qualitative screen for IgG, IgM and IgA COVID-19 spike RBD antibodies is  performed on the [employer] Sheri, and this test is approved by the FDA under the  Emergency Use Authorization (EUA).  Negative results do not rule out COVID-19 infection. Results from antibody  testing should not be used as the sole basis to diagnose or exclude SARS-CoV-2  infection or to inform infection status. COVID-19 PCR test should be ordered if  current infection is suspected. False positive results may occur in rare cases  due to cross-reacting antibodies.  Testing performed by [facility], [facility], [address], [address], [place], [place] [address]  As discussed the antibody screen is NEGATIVE for Covid.    Please call with questions or contact us using [employer].    Sincerely,        Electronically signed by Justen Waller MD

## 2021-07-04 NOTE — ADDENDUM NOTE
Addendum Note by Kaushik Waller MD at 3/19/2021  8:50 AM     Author: Kaushik Waller MD Service: -- Author Type: Physician    Filed: 3/19/2021  9:39 PM Encounter Date: 3/19/2021 Status: Signed    : Kaushik Waller MD (Physician)    Addended by: KAUSHIK WALLER on: 3/19/2021 09:39 PM        Modules accepted: Level of Service

## 2021-07-04 NOTE — ADDENDUM NOTE
Addendum Note by Kaushik Waller MD at 3/19/2021  8:50 AM     Author: Kaushik Waller MD Service: -- Author Type: Physician    Filed: 3/19/2021 11:14 AM Encounter Date: 3/19/2021 Status: Signed    : Kaushik Waller MD (Physician)    Addended by: KAUSHIK WALLER on: 3/19/2021 11:14 AM        Modules accepted: Level of Service

## 2021-08-16 DIAGNOSIS — K21.00 GASTROESOPHAGEAL REFLUX DISEASE WITH ESOPHAGITIS, UNSPECIFIED WHETHER HEMORRHAGE: Primary | ICD-10-CM

## 2021-08-18 NOTE — TELEPHONE ENCOUNTER
"RN will fill since medication was found on med list  Last Written Prescription Date:  6/7/2020  Last Fill Quantity: 180,  # refills: 3   Last office visit provider:  3/19/21     Requested Prescriptions   Pending Prescriptions Disp Refills     omeprazole (PRILOSEC) 20 MG DR capsule [Pharmacy Med Name: OMEPRAZOLE 20MG CAPSULES] 180 capsule      Sig: TAKE 2 CAPSULES BY MOUTH DAILY BEFORE BREAKFAST       PPI Protocol Failed - 8/16/2021  3:37 AM        Failed - Medication is active on med list        Passed - Not on Clopidogrel (unless Pantoprazole ordered)        Passed - No diagnosis of osteoporosis on record        Passed - Recent (12 mo) or future (30 days) visit within the authorizing provider's specialty     Patient has had an office visit with the authorizing provider or a provider within the authorizing providers department within the previous 12 mos or has a future within next 30 days. See \"Patient Info\" tab in inbasket, or \"Choose Columns\" in Meds & Orders section of the refill encounter.              Passed - Patient is age 18 or older             Elizabeth Henderson RN 08/18/21 1:27 PM  "

## 2021-09-05 ENCOUNTER — HEALTH MAINTENANCE LETTER (OUTPATIENT)
Age: 71
End: 2021-09-05

## 2021-10-04 ENCOUNTER — LAB (OUTPATIENT)
Dept: LAB | Facility: CLINIC | Age: 71
End: 2021-10-04
Payer: COMMERCIAL

## 2021-10-04 DIAGNOSIS — C61 PROSTATE CANCER (H): ICD-10-CM

## 2021-10-04 LAB — PSA SERPL-MCNC: <0.1 UG/L (ref 0–6.5)

## 2021-10-04 PROCEDURE — 36415 COLL VENOUS BLD VENIPUNCTURE: CPT

## 2021-10-04 PROCEDURE — 84153 ASSAY OF PSA TOTAL: CPT

## 2021-11-16 DIAGNOSIS — K21.00 GASTROESOPHAGEAL REFLUX DISEASE WITH ESOPHAGITIS, UNSPECIFIED WHETHER HEMORRHAGE: ICD-10-CM

## 2021-12-02 ENCOUNTER — ALLIED HEALTH/NURSE VISIT (OUTPATIENT)
Dept: FAMILY MEDICINE | Facility: CLINIC | Age: 71
End: 2021-12-02
Payer: MEDICARE

## 2021-12-02 DIAGNOSIS — Z23 IMMUNIZATION DUE: Primary | ICD-10-CM

## 2021-12-02 PROCEDURE — 90662 IIV NO PRSV INCREASED AG IM: CPT

## 2021-12-02 PROCEDURE — 90471 IMMUNIZATION ADMIN: CPT

## 2022-01-19 DIAGNOSIS — K40.90 INGUINAL HERNIA WITHOUT OBSTRUCTION OR GANGRENE, RECURRENCE NOT SPECIFIED, UNSPECIFIED LATERALITY: Primary | ICD-10-CM

## 2022-02-09 ENCOUNTER — OFFICE VISIT (OUTPATIENT)
Dept: SURGERY | Facility: CLINIC | Age: 72
End: 2022-02-09
Attending: FAMILY MEDICINE
Payer: MEDICARE

## 2022-02-09 VITALS — BODY MASS INDEX: 29.41 KG/M2 | SYSTOLIC BLOOD PRESSURE: 132 MMHG | DIASTOLIC BLOOD PRESSURE: 76 MMHG | WEIGHT: 205 LBS

## 2022-02-09 DIAGNOSIS — K40.90 INGUINAL HERNIA WITHOUT OBSTRUCTION OR GANGRENE, RECURRENCE NOT SPECIFIED, UNSPECIFIED LATERALITY: ICD-10-CM

## 2022-02-09 PROCEDURE — 99204 OFFICE O/P NEW MOD 45 MIN: CPT | Performed by: SURGERY

## 2022-02-09 NOTE — LETTER
2/9/2022         RE: Zachariah Wilkerson Jr.  [address] [facility] [address]        Dear Colleague,    Thank you for referring your patient, Zachariah Wilkerson Jr., to the [facility] [place]. Please see a copy of my visit note below.    I was asked to consult on this pt by Justen Waller for evaluation a hernia.    HPI:  This is a 71 year old adult here today with concerns of pain and bulging in Zachariah Wilkerson Jr.'s Left groin. Zachariah Wilkerson Jr. has noted this for the past 4 weeks. The discomfort he is experiencing is a deep gnawing pain that is worse toward the end of the day.  He has felt the mass growing over the last few weeks.  He was able to reduce it a bit on his own. .    Allergies:Patient has no known allergies.    Past Medical History:   Diagnosis Date     BPH (benign prostatic hypertrophy)      Diverticula, colon      Diverticulosis      DJD (degenerative joint disease) of hip      Gastro-oesophageal reflux disease      GERD (gastroesophageal reflux disease)      Glaucoma      Hyperlipidemia      Prostate cancer (H)      Restless leg syndrome        Past Surgical History:   Procedure Laterality Date     ARTHROPLASTY HIP       ARTHROPLASTY MINIMALLY INVASIVE HIP  9/5/2013    Procedure: ARTHROPLASTY MINIMALLY INVASIVE HIP;  Left Total Hip Arthroplasty Minimally Invasive Two Incision;  Surgeon: Justen Hooper MD;  Location: [facility]     ARTHROSCOPY KNEE       JOINT REPLACEMENT      both hips     LAPAROSCOPIC RETROPUBIC PROSTATECTOMY  09/28/2016     SHOULDER SURGERY       SHOULDER SURGERY      both shoulders     [facility] TOTAL HIP ARTHROPLASTY      Description: Total Hip Replacement;  Recorded: 06/21/2010;  Comments: May 6, 2010       CURRENT MEDS:  Current Outpatient Medications   Medication Sig Dispense Refill     LANsoprazole (PREVACID) 30 MG capsule Take 30 mg by mouth daily       latanoprost (XALATAN) 0.005 % ophthalmic solution Place 1 drop into both eyes  At Bedtime       omeprazole (PRILOSEC) 20 MG DR capsule TAKE 2 CAPSULES BY MOUTH DAILY BEFORE BREAKFAST 180 capsule 0       Family History   Problem Relation Age of Onset     Dementia Mother      Cerebrovascular Disease Father      Crohn's Disease Father      Prostate Cancer Father      Lung Cancer Sister      No Known Problems Son      Prostate Cancer Paternal Uncle      No Known Problems Sister      No Known Problems Son      No Known Problems Son      No Known Problems Son      Prostate Cancer Paternal Uncle         reports that Zachariah Wilkerson Jr. has never smoked. Zachariah Wilkerson Jr. has never used smokeless tobacco. Zachariah Wilkerson Jr. reports current alcohol use. Zachariah Wilkerson Jr. reports that Zachariah Wilkerson Jr. does not use drugs.    Review of Systems -   10 point Review of systems is negative except for; as mentioned above in HPI and PMHx    There were no vitals taken for this visit.  There is no height or weight on file to calculate BMI.    EXAM:  GENERAL: Well developed adult  HEENT: EOMI, Anicteric Sclera  NECK:  No masses, good flexion and extention of the neck  CARDIAC: RRR w/out murmur   CHEST/LUNG: Clear  ABDOMEN: Left inguinal hernia.   GENITAL: Both testicles descended without masses  NEURO: He is ambulatory with good strength in both legs.    IMAGES: None    Assessment/Plan: Pt with a Left inguinal hernia. I discussed this at length with Zachariah FABIOLA [place].  I went over conservative management as well as surgical treatment for hernias.   I would reccomend a laparoscopic inguinal hernia repair, understanding the possibility of converting to an open operation.   I went over the small risks of surgery including but not limited to bleeding and infection. I discussed the expected recovery time as well. We will schedule this hernia repair at his earliest convenience.    Bunny Naranjo MD  [facility]; [facility]  [phone]      Again, thank you for allowing me to participate in the care of  your patient.        Sincerely,        Bunny Naranjo MD

## 2022-02-09 NOTE — PROGRESS NOTES
I was asked to consult on this pt by Justen Waller for evaluation a hernia.    HPI:  This is a 71 year old adult here today with concerns of pain and bulging in Zachariah Wilkerson Jr.'s Left groin. Zachariah Wilkerson Jr. has noted this for the past 4 weeks. The discomfort he is experiencing is a deep gnawing pain that is worse toward the end of the day.  He has felt the mass growing over the last few weeks.  He was able to reduce it a bit on his own. .    Allergies:Patient has no known allergies.    Past Medical History:   Diagnosis Date     BPH (benign prostatic hypertrophy)      Diverticula, colon      Diverticulosis      DJD (degenerative joint disease) of hip      Gastro-oesophageal reflux disease      GERD (gastroesophageal reflux disease)      Glaucoma      Hyperlipidemia      Prostate cancer (H)      Restless leg syndrome        Past Surgical History:   Procedure Laterality Date     ARTHROPLASTY HIP       ARTHROPLASTY MINIMALLY INVASIVE HIP  9/5/2013    Procedure: ARTHROPLASTY MINIMALLY INVASIVE HIP;  Left Total Hip Arthroplasty Minimally Invasive Two Incision;  Surgeon: Justen Hooper MD;  [facility]     ARTHROSCOPY KNEE       JOINT REPLACEMENT      both hips     LAPAROSCOPIC RETROPUBIC PROSTATECTOMY  09/28/2016     SHOULDER SURGERY       SHOULDER SURGERY      both shoulders     [facility] TOTAL HIP ARTHROPLASTY      Description: Total Hip Replacement;  Recorded: 06/21/2010;  Comments: May 6, 2010       CURRENT MEDS:  Current Outpatient Medications   Medication Sig Dispense Refill     LANsoprazole (PREVACID) 30 MG capsule Take 30 mg by mouth daily       latanoprost (XALATAN) 0.005 % ophthalmic solution Place 1 drop into both eyes At Bedtime       omeprazole (PRILOSEC) 20 MG DR capsule TAKE 2 CAPSULES BY MOUTH DAILY BEFORE BREAKFAST 180 capsule 0       Family History   Problem Relation Age of Onset     Dementia Mother      Cerebrovascular Disease Father      Crohn's Disease Father      Prostate Cancer Father       Lung Cancer Sister      No Known Problems Son      Prostate Cancer Paternal Uncle      No Known Problems Sister      No Known Problems Son      No Known Problems Son      No Known Problems Son      Prostate Cancer Paternal Uncle         reports that Zachariah Wilkerson Jr. has never smoked. Zachariah Wilkerson Jr. has never used smokeless tobacco. Zachariah Wilkerson Jr. reports current alcohol use. Zachariah Wilkerson Jr. reports that Zachariah Wilkerson Jr. does not use drugs.    Review of Systems -   10 point Review of systems is negative except for; as mentioned above in HPI and PMHx    There were no vitals taken for this visit.  There is no height or weight on file to calculate BMI.    EXAM:  GENERAL: Well developed adult  HEENT: EOMI, Anicteric Sclera  NECK:  No masses, good flexion and extention of the neck  CARDIAC: RRR w/out murmur   CHEST/LUNG: Clear  ABDOMEN: Left inguinal hernia.   GENITAL: Both testicles descended without masses  NEURO: He is ambulatory with good strength in both legs.    IMAGES: None    Assessment/Plan: Pt with a Left inguinal hernia. I discussed this at length with Zachariah Wilkerson Jr..  I went over conservative management as well as surgical treatment for hernias.   I would reccomend a laparoscopic inguinal hernia repair, understanding the possibility of converting to an open operation.   I went over the small risks of surgery including but not limited to bleeding and infection. I discussed the expected recovery time as well. We will schedule this hernia repair at his earliest convenience.    Bunny Naranjo MD  [facility]; [facility]  [phone]

## 2022-02-14 ENCOUNTER — TELEPHONE (OUTPATIENT)
Dept: SURGERY | Facility: CLINIC | Age: 72
End: 2022-02-14
Payer: MEDICARE

## 2022-02-14 DIAGNOSIS — K21.00 GASTROESOPHAGEAL REFLUX DISEASE WITH ESOPHAGITIS, UNSPECIFIED WHETHER HEMORRHAGE: ICD-10-CM

## 2022-02-14 NOTE — LETTER
We've received instruction to get you scheduled for surgery with Dr Naranjo. We have that arranged as follows:     Surgery Date: 4/21/2022  Location: [address], [address] (3rd floor) [facility]  Arrival Time: 11:00 am (Unless instructed differently by the pre-op call nurse)     Post op Appointment: 5/5/2022 at 10:00 am with a physician assistant over the phone     Prep Instructions:     1. Please schedule a pre-op physical with your primary care doctor. This may be virtual or face-to-face depending on your doctors preference. Call them right away to schedule this.    2. PCR-Rated COVID19 testing is required within 4 days of surgery. We have this scheduled for you at [facility], [address] [place] [address], 1st Floor on 4/18/2022 at 9:30 am. Follow the specific instructions you receive by Adela. If your test is positive, your surgery will be canceled.     3. Nothing to eat or drink for 8 hours before surgery unless instructed differently by the pre-op nurse.    4. If the community sees a new COVID19 surge, your procedure may need to be postponed. We will contact you if this happens.     5. You will need an adult to drive you home and stay with you 24 hours after surgery. Public transportation or Medical [employer] Services are not permitted.    6. You may have one family member wait in the lobby at the surgery center during your surgery. Visitor restrictions are subject to change, please verify with the pre-op nurse when they call.    7. You will be screened for high-risk exposure to Covid-19 during the pre-op call.  We encourage you to quarantine yourself away from any Covid-19 people for 10 days before surgery to avoid possible last minute cancellations.   When you arrive to the surgery center, you will again be screened for COVID19 symptoms. If you screen positive, your surgery will need to be postponed.    8. We always encourage you to notify your insurance any time you have  medical tests or procedures scheduled including surgery. The number is usually right on the back of your insurance card. Please call [facility] Cost of Care at [phone] for the surgeon fees, and [facility] Cost of Care [phone] for facility fees if you need pricing information.     9. You will receive a call from a pre-op call nurse 1-3 days prior to surgery. She will go over more details with you.     Call our office if you have any questions! Thank you!

## 2022-02-19 ENCOUNTER — HEALTH MAINTENANCE LETTER (OUTPATIENT)
Age: 72
End: 2022-02-19

## 2022-02-21 NOTE — TELEPHONE ENCOUNTER
Spoke with Zachariah sosa regarding surgery scheduling   with Dr. Naranjo   at [facility] on  date: 2022 Estimated arrival 11:00:  AM    Patient was informed of the followin. Patient has been informed to consult their PCP/Cardiologist about stopping their blood thinners about a week before surgery.  2. Pre Op Physical will need to be done within 30 days prior to surgery  3. Required Covid Test with in 4 days prior to surgery: Date: 2022 at 9:30 am, Location: [facility]  4. Ride required after surgery, can not use [employer] or public transportation.    Patient was informed that failure to do so may result in cancellation of surgery      Post Op: Date: 2022 at 10:00 am, Location: tele      Surgery Letter sent via [employer]

## 2022-02-24 PROBLEM — K40.90 INGUINAL HERNIA WITHOUT OBSTRUCTION OR GANGRENE, RECURRENCE NOT SPECIFIED, UNSPECIFIED LATERALITY: Status: ACTIVE | Noted: 2022-02-24

## 2022-03-10 DIAGNOSIS — Z11.59 ENCOUNTER FOR SCREENING FOR OTHER VIRAL DISEASES: Primary | ICD-10-CM

## 2022-03-18 ENCOUNTER — TRANSFERRED RECORDS (OUTPATIENT)
Dept: HEALTH INFORMATION MANAGEMENT | Facility: CLINIC | Age: 72
End: 2022-03-18
Payer: MEDICARE

## 2022-04-18 ENCOUNTER — OFFICE VISIT (OUTPATIENT)
Dept: FAMILY MEDICINE | Facility: CLINIC | Age: 72
End: 2022-04-18
Payer: MEDICARE

## 2022-04-18 VITALS
RESPIRATION RATE: 16 BRPM | HEART RATE: 61 BPM | SYSTOLIC BLOOD PRESSURE: 156 MMHG | WEIGHT: 202.5 LBS | BODY MASS INDEX: 30.69 KG/M2 | HEIGHT: 68 IN | TEMPERATURE: 97.2 F | DIASTOLIC BLOOD PRESSURE: 83 MMHG

## 2022-04-18 DIAGNOSIS — R03.0 ELEVATED BP WITHOUT DIAGNOSIS OF HYPERTENSION: ICD-10-CM

## 2022-04-18 DIAGNOSIS — Z11.59 ENCOUNTER FOR SCREENING FOR OTHER VIRAL DISEASES: ICD-10-CM

## 2022-04-18 DIAGNOSIS — K40.90 NON-RECURRENT UNILATERAL INGUINAL HERNIA WITHOUT OBSTRUCTION OR GANGRENE: ICD-10-CM

## 2022-04-18 DIAGNOSIS — Z01.818 PRE-OP EXAM: Primary | ICD-10-CM

## 2022-04-18 LAB
ALBUMIN SERPL-MCNC: 3.7 G/DL (ref 3.5–5)
ANION GAP SERPL CALCULATED.3IONS-SCNC: 10 MMOL/L (ref 5–18)
ATRIAL RATE - MUSE: 58 BPM
BUN SERPL-MCNC: 15 MG/DL (ref 8–28)
CALCIUM SERPL-MCNC: 9.4 MG/DL (ref 8.5–10.5)
CHLORIDE BLD-SCNC: 103 MMOL/L (ref 98–107)
CO2 SERPL-SCNC: 25 MMOL/L (ref 22–31)
CREAT SERPL-MCNC: 1.1 MG/DL (ref 0.6–1.3)
DIASTOLIC BLOOD PRESSURE - MUSE: NORMAL MMHG
GFR SERPL CREATININE-BSD FRML MDRD: 72 ML/MIN/1.73M2
GLUCOSE BLD-MCNC: 106 MG/DL (ref 70–125)
HGB BLD-MCNC: 16 G/DL (ref 11.7–17.7)
INTERPRETATION ECG - MUSE: NORMAL
P AXIS - MUSE: 39 DEGREES
PHOSPHATE SERPL-MCNC: 3.1 MG/DL (ref 2.5–4.5)
POTASSIUM BLD-SCNC: 4.8 MMOL/L (ref 3.5–5)
PR INTERVAL - MUSE: 182 MS
QRS DURATION - MUSE: 96 MS
QT - MUSE: 410 MS
QTC - MUSE: 402 MS
R AXIS - MUSE: 12 DEGREES
SODIUM SERPL-SCNC: 138 MMOL/L (ref 136–145)
SYSTOLIC BLOOD PRESSURE - MUSE: NORMAL MMHG
T AXIS - MUSE: 17 DEGREES
VENTRICULAR RATE- MUSE: 58 BPM

## 2022-04-18 PROCEDURE — 36415 COLL VENOUS BLD VENIPUNCTURE: CPT | Performed by: FAMILY MEDICINE

## 2022-04-18 PROCEDURE — 85018 HEMOGLOBIN: CPT | Performed by: FAMILY MEDICINE

## 2022-04-18 PROCEDURE — 99214 OFFICE O/P EST MOD 30 MIN: CPT | Performed by: FAMILY MEDICINE

## 2022-04-18 PROCEDURE — U0003 INFECTIOUS AGENT DETECTION BY NUCLEIC ACID (DNA OR RNA); SEVERE ACUTE RESPIRATORY SYNDROME CORONAVIRUS 2 (SARS-COV-2) (CORONAVIRUS DISEASE [COVID-19]), AMPLIFIED PROBE TECHNIQUE, MAKING USE OF HIGH THROUGHPUT TECHNOLOGIES AS DESCRIBED BY CMS-2020-01-R: HCPCS | Performed by: FAMILY MEDICINE

## 2022-04-18 PROCEDURE — 93010 ELECTROCARDIOGRAM REPORT: CPT | Mod: OFF | Performed by: INTERNAL MEDICINE

## 2022-04-18 PROCEDURE — U0005 INFEC AGEN DETEC AMPLI PROBE: HCPCS | Performed by: FAMILY MEDICINE

## 2022-04-18 PROCEDURE — 80069 RENAL FUNCTION PANEL: CPT | Performed by: FAMILY MEDICINE

## 2022-04-18 PROCEDURE — 93005 ELECTROCARDIOGRAM TRACING: CPT | Performed by: FAMILY MEDICINE

## 2022-04-18 RX ORDER — TIMOLOL MALEATE 5 MG/ML
SOLUTION/ DROPS OPHTHALMIC
COMMUNITY
Start: 2022-03-04 | End: 2024-05-20

## 2022-04-18 NOTE — PROGRESS NOTES
[facility]  [address] [facility] [address]  Phone: [phone]  Fax: [phone]  Primary Provider: Justen Waller  Pre-op Performing Provider: SUSANNAH HADDAD      PREOPERATIVE EVALUATION:  Today's date: 4/18/2022    Zachariah Wilkerson Jr. is a 71 year old adult who presents for a preoperative evaluation.    Surgical Information:  Surgery/Procedure: HERNIORRHAPHY, INGUINAL, LAPAROSCOPIC, LEFT  Surgery Location: [facility]  Surgeon: Dr Michael  Surgery Date: 04/21/22  Time of Surgery: unknown  Where patient plans to recover: At home with family  Fax number for surgical facility: Note does not need to be faxed, will be available electronically in Epic.    Type of Anesthesia Anticipated: General    Assessment & Plan     The proposed surgical procedure is considered INTERMEDIATE risk.      ICD-10-CM    1. Pre-op exam  Z01.818 EKG 12-lead, tracing only     Hemoglobin     Hemoglobin   2. Non-recurrent unilateral inguinal hernia without obstruction or gangrene  K40.90    3. Elevated BP without diagnosis of hypertension  R03.0 Renal panel (Alb, BUN, Ca, Cl, CO2, Creat, Gluc, Phos, K, Na)     Renal panel (Alb, BUN, Ca, Cl, CO2, Creat, Gluc, Phos, K, Na)   4. Encounter for screening for other viral diseases  Z11.59 Asymptomatic COVID-19 Virus (Coronavirus) by PCR Nose     No aspirin, ibuprofen or vitamins from now until after surgery.  Tylenol is safe.    Recommended starting a blood pressure medication but patient declined today.    Recommend checking blood pressure at home.  Good idea to either set a nurse appointment here or the next time you have your regular appointment here or bring in your home cuff for comparison.  We want to see if this is true hypertension or whitecoat hypertension.    We want blood pressure under 140 on top and under 90 on the bottom.    COVID swab and PSA done today.    Set physical with Dr. Waller or send physical information from  upcoming physical with [facility] and the labs.         Risks and Recommendations:  The patient has the following additional risks and recommendations for perioperative complications:   - No identified additional risk factors other than previously addressed    Medication Instructions:  see AVS    RECOMMENDATION:  APPROVAL GIVEN to proceed with proposed procedure, without further diagnostic evaluation.            30 minutes spent on the date of the encounter doing chart review, history and exam, documentation and further activities per the note        Subjective     HPI related to upcoming procedure: 71-year-old male with no formal diagnosis of hypertension but blood pressures have been borderline for couple of years.  No diagnosis of heart or lung disease.  No history of stroke.  He noticed a bulge and discomfort in the left groin at the end of January.  He has been diagnosed with a left inguinal hernia and recommended to have this repaired.    Preop Questions 4/18/2022   1. Have you ever had a heart attack or stroke? No   2. Have you ever had surgery on your heart or blood vessels, such as a stent placement, a coronary artery bypass, or surgery on an artery in your head, neck, heart, or legs? No   3. Do you have chest pain with activity? No   4. Do you have a history of  heart failure? No   5. Do you currently have a cold, bronchitis or symptoms of other infection? No   6. Do you have a cough, shortness of breath, or wheezing? No   7. Do you or anyone in your family have previous history of blood clots? No   8. Do you or does anyone in your family have a serious bleeding problem such as prolonged bleeding following surgeries or cuts? No   9. Have you ever had problems with anemia or been told to take iron pills? No   10. Have you had any abnormal blood loss such as black, tarry or bloody stools, or abnormal vaginal bleeding? No   10. Have you had any abnormal blood loss such as black, tarry or  bloody stools? No   11. Have you ever had a blood transfusion? No   12. Are you willing to have a blood transfusion if it is medically needed before, during, or after your surgery? Yes   13. Have you or any of your relatives ever had problems with anesthesia? No   14. Do you have sleep apnea, excessive snoring or daytime drowsiness? No   15. Do you have any artifical heart valves or other implanted medical devices like a pacemaker, defibrillator, or continuous glucose monitor? No   16. Do you have artificial joints? YES - bilateral hips   17. Are you allergic to latex? No       Health Care Directive:  Patient does not have a Health Care Directive or Living Will: Has one    Preoperative Review of :  NA  NA}    Status of Chronic Conditions:  See problem list for active medical problems.  Problems all longstanding and stable, except as noted/documented.  See ROS for pertinent symptoms related to these conditions.      Review of Systems  CONSTITUTIONAL: NEGATIVE for fever, chills, change in weight  INTEGUMENTARY/SKIN: NEGATIVE for worrisome rashes, moles or lesions  EYES: NEGATIVE for vision changes or irritation  ENT/MOUTH: NEGATIVE for ear, mouth and throat problems  RESP: NEGATIVE for significant cough or SOB  CV: NEGATIVE for chest pain, palpitations or peripheral edema  GI: NEGATIVE for nausea, abdominal pain, heartburn, or change in bowel habits  : NEGATIVE for frequency, dysuria, or hematuria  MUSCULOSKELETAL: NEGATIVE for significant arthralgias or myalgia  NEURO: NEGATIVE for weakness, dizziness or paresthesias  ENDOCRINE: NEGATIVE for temperature intolerance, skin/hair changes  HEME: NEGATIVE for bleeding problems  PSYCHIATRIC: NEGATIVE for changes in mood or affect    Patient Active Problem List    Diagnosis Date Noted     Unilateral inguinal hernia 04/18/2022     Priority: Medium     Inguinal hernia without obstruction or gangrene, recurrence not specified, unspecified laterality 02/24/2022      Priority: Medium     Added automatically from request for surgery [id number]       OA (osteoarthritis) of hip 09/05/2013     Priority: Medium      Past Medical History:   Diagnosis Date     BPH (benign prostatic hypertrophy)      Diverticula, colon      Diverticulosis      DJD (degenerative joint disease) of hip      Gastro-oesophageal reflux disease      GERD (gastroesophageal reflux disease)      Hyperlipidemia      Increased pressure in the eye      Prostate cancer (H)      Restless leg syndrome      Past Surgical History:   Procedure Laterality Date     ARTHROPLASTY MINIMALLY INVASIVE HIP  09/05/2013    Procedure: ARTHROPLASTY MINIMALLY INVASIVE HIP;  Left Total Hip Arthroplasty Minimally Invasive Two Incision;  Surgeon: Justen Hooper MD;  [facility]     ARTHROSCOPY KNEE Left 2010     CATARACT EXTRACTION Bilateral 2018     hand surgeries Bilateral     July 1982, Nov 1982 and July 1983 for tendon repair     LAPAROSCOPIC RETROPUBIC PROSTATECTOMY  09/28/2016     SHOULDER SURGERY Bilateral     both shoulders for impingement, about 2005 right, 2008 for left     [facility] TOTAL HIP ARTHROPLASTY Right     Description: Total Hip Replacement;  Recorded: 06/21/2010;  Comments: May 6, 2010     Current Outpatient Medications   Medication Sig Dispense Refill     latanoprost (XALATAN) 0.005 % ophthalmic solution Place 1 drop into both eyes At Bedtime       omeprazole (PRILOSEC) 20 MG DR capsule TAKE 2 CAPSULES BY MOUTH DAILY BEFORE AND BREAKFAST 180 capsule 3     timolol maleate (TIMOPTIC) 0.5 % ophthalmic solution PLACE 1 DROP IN BOTH EYES EVERY MORNING         No Known Allergies     Social History     Tobacco Use     Smoking status: Never Smoker     Smokeless tobacco: Never Used   Substance Use Topics     Alcohol use: Yes     Comment: Alcoholic Drinks/day: Occasional     Family History   Problem Relation Age of Onset     Dementia Mother      Cerebrovascular Disease Father      Crohn's Disease Father      Prostate Cancer  "Father      Lung Cancer Sister      No Known Problems Son      Prostate Cancer Paternal Uncle      No Known Problems Sister      No Known Problems Son      No Known Problems Son      No Known Problems Son      Prostate Cancer Paternal Uncle      History   Drug Use No         Objective     BP (!) 156/83   Pulse 61   Temp 97.2  F (36.2  C) (Oral)   Resp 16   Ht 1.734 m (5' 8.25\")   Wt 91.9 kg (202 lb 8 oz)   BMI 30.56 kg/m      Physical Exam    GENERAL APPEARANCE: healthy, alert and no distress     EYES: EOMI,  PERRL     HENT: ear canals and TM's normal and nose and mouth without ulcers or lesions     NECK: no adenopathy, no asymmetry, masses, or scars and thyroid normal to palpation     RESP: lungs clear to auscultation - no rales, rhonchi or wheezes     CV: regular rates and rhythm, normal S1 S2, no S3 or S4 and no murmur, click or rub     ABDOMEN:  soft, nontender, no HSM or masses and bowel sounds normal     MS: extremities normal- no gross deformities noted, no evidence of inflammation in joints, FROM in all extremities.     SKIN: no suspicious lesions or rashes     NEURO: Normal strength and tone, sensory exam grossly normal, mentation intact and speech normal     PSYCH: mentation appears normal. and affect normal/bright     LYMPHATICS: No cervical adenopathy    No results for input(s): HGB, PLT, INR, NA, POTASSIUM, CR, A1C in the last 72052 hours.     Diagnostics:  Labs pending at this time.  Results will be reviewed when available.   EKG:  Sinus bradycardia   Otherwise normal ECG   When compared with ECG of 25-AUG-2017 07:27,   No significant change was found     Revised Cardiac Risk Index (RCRI):  The patient has the following serious cardiovascular risks for perioperative complications:   - No serious cardiac risks = 0 points     RCRI Interpretation: 0 points: Class I (very low risk - 0.4% complication rate)           Signed Electronically by: Nanci Olivares MD  Copy of this evaluation report is " provided to requesting physician.

## 2022-04-18 NOTE — PATIENT INSTRUCTIONS
No aspirin, ibuprofen or vitamins from now until after surgery.  Tylenol is safe.    Recommended starting a blood pressure medication but patient declined today.    Recommend checking blood pressure at home.  Good idea to either set a nurse appointment here or the next time you have your regular appointment here or bring in your home cuff for comparison.  We want to see if this is true hypertension or whitecoat hypertension.    We want blood pressure under 140 on top and under 90 on the bottom.    COVID swab and PSA done today.    Set physical with Dr. Waller or send physical information from upcoming physical with [email or web address] and the labs.

## 2022-04-19 LAB — SARS-COV-2 RNA RESP QL NAA+PROBE: NEGATIVE

## 2022-04-20 ENCOUNTER — ANESTHESIA EVENT (OUTPATIENT)
Dept: SURGERY | Facility: AMBULATORY SURGERY CENTER | Age: 72
End: 2022-04-20
Payer: MEDICARE

## 2022-04-20 ENCOUNTER — LAB (OUTPATIENT)
Dept: LAB | Facility: CLINIC | Age: 72
End: 2022-04-20
Payer: MEDICARE

## 2022-04-20 DIAGNOSIS — C61 PROSTATE CANCER (H): Primary | ICD-10-CM

## 2022-04-20 DIAGNOSIS — C61 PROSTATE CANCER (H): ICD-10-CM

## 2022-04-20 LAB — PSA SERPL-MCNC: <0.1 UG/L (ref 0–6.5)

## 2022-04-20 PROCEDURE — 36415 COLL VENOUS BLD VENIPUNCTURE: CPT

## 2022-04-20 PROCEDURE — 84153 ASSAY OF PSA TOTAL: CPT

## 2022-04-21 ENCOUNTER — HOSPITAL ENCOUNTER (OUTPATIENT)
Facility: AMBULATORY SURGERY CENTER | Age: 72
Discharge: HOME | End: 2022-04-21
Attending: SURGERY
Payer: MEDICARE

## 2022-04-21 ENCOUNTER — ANESTHESIA (OUTPATIENT)
Dept: SURGERY | Facility: AMBULATORY SURGERY CENTER | Age: 72
End: 2022-04-21
Payer: MEDICARE

## 2022-04-21 VITALS
BODY MASS INDEX: 28.2 KG/M2 | OXYGEN SATURATION: 92 % | HEIGHT: 70 IN | WEIGHT: 197 LBS | SYSTOLIC BLOOD PRESSURE: 134 MMHG | RESPIRATION RATE: 16 BRPM | DIASTOLIC BLOOD PRESSURE: 72 MMHG | HEART RATE: 71 BPM | TEMPERATURE: 97 F

## 2022-04-21 DIAGNOSIS — K40.90 INGUINAL HERNIA WITHOUT OBSTRUCTION OR GANGRENE, RECURRENCE NOT SPECIFIED, UNSPECIFIED LATERALITY: ICD-10-CM

## 2022-04-21 PROCEDURE — 49659 UNLSTD LAPS PX HRNAP HRNRPHY: CPT | Mod: LT | Performed by: SURGERY

## 2022-04-21 DEVICE — LAP PROGRIP 15X10CM LPG1510X2: Type: IMPLANTABLE DEVICE | Site: GROIN | Status: FUNCTIONAL

## 2022-04-21 RX ORDER — FENTANYL CITRATE 0.05 MG/ML
25 INJECTION, SOLUTION INTRAMUSCULAR; INTRAVENOUS
Status: DISCONTINUED | OUTPATIENT
Start: 2022-04-21 | End: 2022-04-22 | Stop reason: HOSPADM

## 2022-04-21 RX ORDER — HYDROMORPHONE HYDROCHLORIDE 1 MG/ML
0.5 INJECTION, SOLUTION INTRAMUSCULAR; INTRAVENOUS; SUBCUTANEOUS EVERY 5 MIN PRN
Status: DISCONTINUED | OUTPATIENT
Start: 2022-04-21 | End: 2022-04-22 | Stop reason: HOSPADM

## 2022-04-21 RX ORDER — FENTANYL CITRATE 0.05 MG/ML
25-50 INJECTION, SOLUTION INTRAMUSCULAR; INTRAVENOUS EVERY 5 MIN PRN
Status: DISCONTINUED | OUTPATIENT
Start: 2022-04-21 | End: 2022-04-22 | Stop reason: HOSPADM

## 2022-04-21 RX ORDER — EPHEDRINE SULFATE 50 MG/ML
INJECTION, SOLUTION INTRAMUSCULAR; INTRAVENOUS; SUBCUTANEOUS PRN
Status: DISCONTINUED | OUTPATIENT
Start: 2022-04-21 | End: 2022-04-21

## 2022-04-21 RX ORDER — ACETAMINOPHEN 325 MG/1
975 TABLET ORAL ONCE
Status: COMPLETED | OUTPATIENT
Start: 2022-04-21 | End: 2022-04-21

## 2022-04-21 RX ORDER — LIDOCAINE HYDROCHLORIDE 20 MG/ML
INJECTION, SOLUTION INFILTRATION; PERINEURAL PRN
Status: DISCONTINUED | OUTPATIENT
Start: 2022-04-21 | End: 2022-04-21

## 2022-04-21 RX ORDER — ACETAMINOPHEN 325 MG/1
650 TABLET ORAL EVERY 4 HOURS PRN
Qty: 50 TABLET | Refills: 0 | Status: SHIPPED | OUTPATIENT
Start: 2022-04-21

## 2022-04-21 RX ORDER — OXYCODONE HYDROCHLORIDE 5 MG/1
5 TABLET ORAL EVERY 4 HOURS PRN
Status: DISCONTINUED | OUTPATIENT
Start: 2022-04-21 | End: 2022-04-22 | Stop reason: HOSPADM

## 2022-04-21 RX ORDER — GLYCOPYRROLATE 0.2 MG/ML
INJECTION, SOLUTION INTRAMUSCULAR; INTRAVENOUS PRN
Status: DISCONTINUED | OUTPATIENT
Start: 2022-04-21 | End: 2022-04-21

## 2022-04-21 RX ORDER — IBUPROFEN 600 MG/1
600 TABLET, FILM COATED ORAL EVERY 6 HOURS PRN
Qty: 30 TABLET | Refills: 0 | Status: SHIPPED | OUTPATIENT
Start: 2022-04-21

## 2022-04-21 RX ORDER — LIDOCAINE 40 MG/G
CREAM TOPICAL
Status: DISCONTINUED | OUTPATIENT
Start: 2022-04-21 | End: 2022-04-22 | Stop reason: HOSPADM

## 2022-04-21 RX ORDER — MAGNESIUM SULFATE HEPTAHYDRATE 40 MG/ML
4 INJECTION, SOLUTION INTRAVENOUS ONCE
Status: COMPLETED | OUTPATIENT
Start: 2022-04-21 | End: 2022-04-21

## 2022-04-21 RX ORDER — SODIUM CHLORIDE, SODIUM LACTATE, POTASSIUM CHLORIDE, CALCIUM CHLORIDE 600; 310; 30; 20 MG/100ML; MG/100ML; MG/100ML; MG/100ML
INJECTION, SOLUTION INTRAVENOUS CONTINUOUS
Status: DISCONTINUED | OUTPATIENT
Start: 2022-04-21 | End: 2022-04-22 | Stop reason: HOSPADM

## 2022-04-21 RX ORDER — NEOSTIGMINE METHYLSULFATE 1 MG/ML
VIAL (ML) INJECTION PRN
Status: DISCONTINUED | OUTPATIENT
Start: 2022-04-21 | End: 2022-04-21

## 2022-04-21 RX ORDER — BUPIVACAINE HYDROCHLORIDE AND EPINEPHRINE 2.5; 5 MG/ML; UG/ML
INJECTION, SOLUTION INFILTRATION; PERINEURAL PRN
Status: DISCONTINUED | OUTPATIENT
Start: 2022-04-21 | End: 2022-04-21 | Stop reason: HOSPADM

## 2022-04-21 RX ORDER — HYDROCODONE BITARTRATE AND ACETAMINOPHEN 5; 325 MG/1; MG/1
1-2 TABLET ORAL EVERY 4 HOURS PRN
Qty: 20 TABLET | Refills: 0 | Status: SHIPPED | OUTPATIENT
Start: 2022-04-21 | End: 2022-05-04

## 2022-04-21 RX ORDER — ACETAMINOPHEN 10 MG/ML
INJECTION, SOLUTION INTRAVENOUS PRN
Status: DISCONTINUED | OUTPATIENT
Start: 2022-04-21 | End: 2022-04-21

## 2022-04-21 RX ORDER — HYDROCODONE BITARTRATE AND ACETAMINOPHEN 5; 325 MG/1; MG/1
1-2 TABLET ORAL EVERY 4 HOURS PRN
Status: DISCONTINUED | OUTPATIENT
Start: 2022-04-21 | End: 2022-04-22 | Stop reason: HOSPADM

## 2022-04-21 RX ORDER — CEFAZOLIN SODIUM 2 G/100ML
2 INJECTION, SOLUTION INTRAVENOUS
Status: COMPLETED | OUTPATIENT
Start: 2022-04-21 | End: 2022-04-21

## 2022-04-21 RX ORDER — MORPHINE SULFATE 0.5 MG/ML
INJECTION, SOLUTION EPIDURAL; INTRATHECAL; INTRAVENOUS PRN
Status: DISCONTINUED | OUTPATIENT
Start: 2022-04-21 | End: 2022-04-21

## 2022-04-21 RX ORDER — LABETALOL 20 MG/4 ML (5 MG/ML) INTRAVENOUS SYRINGE
5 EVERY 10 MIN PRN
Status: DISCONTINUED | OUTPATIENT
Start: 2022-04-21 | End: 2022-04-22 | Stop reason: HOSPADM

## 2022-04-21 RX ORDER — ONDANSETRON 4 MG/1
4 TABLET, ORALLY DISINTEGRATING ORAL EVERY 30 MIN PRN
Status: DISCONTINUED | OUTPATIENT
Start: 2022-04-21 | End: 2022-04-22 | Stop reason: HOSPADM

## 2022-04-21 RX ORDER — FENTANYL CITRATE 50 UG/ML
INJECTION, SOLUTION INTRAMUSCULAR; INTRAVENOUS PRN
Status: DISCONTINUED | OUTPATIENT
Start: 2022-04-21 | End: 2022-04-21

## 2022-04-21 RX ORDER — ONDANSETRON 2 MG/ML
4 INJECTION INTRAMUSCULAR; INTRAVENOUS EVERY 30 MIN PRN
Status: DISCONTINUED | OUTPATIENT
Start: 2022-04-21 | End: 2022-04-22 | Stop reason: HOSPADM

## 2022-04-21 RX ORDER — KETAMINE HYDROCHLORIDE 50 MG/ML
INJECTION, SOLUTION INTRAMUSCULAR; INTRAVENOUS PRN
Status: DISCONTINUED | OUTPATIENT
Start: 2022-04-21 | End: 2022-04-21

## 2022-04-21 RX ORDER — DEXAMETHASONE SODIUM PHOSPHATE 10 MG/ML
INJECTION, SOLUTION INTRAMUSCULAR; INTRAVENOUS PRN
Status: DISCONTINUED | OUTPATIENT
Start: 2022-04-21 | End: 2022-04-21

## 2022-04-21 RX ORDER — CEFAZOLIN SODIUM 2 G/100ML
2 INJECTION, SOLUTION INTRAVENOUS SEE ADMIN INSTRUCTIONS
Status: DISCONTINUED | OUTPATIENT
Start: 2022-04-21 | End: 2022-04-22 | Stop reason: HOSPADM

## 2022-04-21 RX ORDER — ONDANSETRON 2 MG/ML
INJECTION INTRAMUSCULAR; INTRAVENOUS PRN
Status: DISCONTINUED | OUTPATIENT
Start: 2022-04-21 | End: 2022-04-21

## 2022-04-21 RX ORDER — PROPOFOL 10 MG/ML
INJECTION, EMULSION INTRAVENOUS PRN
Status: DISCONTINUED | OUTPATIENT
Start: 2022-04-21 | End: 2022-04-21

## 2022-04-21 RX ADMIN — MAGNESIUM SULFATE HEPTAHYDRATE 4 G: 40 INJECTION, SOLUTION INTRAVENOUS at 11:53

## 2022-04-21 RX ADMIN — Medication 40 MG: at 12:32

## 2022-04-21 RX ADMIN — PROPOFOL 150 MG: 10 INJECTION, EMULSION INTRAVENOUS at 12:32

## 2022-04-21 RX ADMIN — GLYCOPYRROLATE 1 MG: 0.2 INJECTION, SOLUTION INTRAMUSCULAR; INTRAVENOUS at 14:08

## 2022-04-21 RX ADMIN — FENTANYL CITRATE 50 MCG: 50 INJECTION, SOLUTION INTRAMUSCULAR; INTRAVENOUS at 12:50

## 2022-04-21 RX ADMIN — ONDANSETRON 4 MG: 2 INJECTION INTRAMUSCULAR; INTRAVENOUS at 12:34

## 2022-04-21 RX ADMIN — KETAMINE HYDROCHLORIDE 50 MG: 50 INJECTION, SOLUTION INTRAMUSCULAR; INTRAVENOUS at 12:32

## 2022-04-21 RX ADMIN — ACETAMINOPHEN 975 MG: 325 TABLET ORAL at 11:50

## 2022-04-21 RX ADMIN — DEXAMETHASONE SODIUM PHOSPHATE 10 MG: 10 INJECTION, SOLUTION INTRAMUSCULAR; INTRAVENOUS at 12:32

## 2022-04-21 RX ADMIN — Medication 1 MG: at 14:28

## 2022-04-21 RX ADMIN — EPHEDRINE SULFATE 10 MG: 50 INJECTION, SOLUTION INTRAMUSCULAR; INTRAVENOUS; SUBCUTANEOUS at 12:42

## 2022-04-21 RX ADMIN — CEFAZOLIN SODIUM 2 G: 2 INJECTION, SOLUTION INTRAVENOUS at 12:19

## 2022-04-21 RX ADMIN — HYDROCODONE BITARTRATE AND ACETAMINOPHEN 2 TABLET: 5; 325 TABLET ORAL at 15:20

## 2022-04-21 RX ADMIN — SODIUM CHLORIDE, SODIUM LACTATE, POTASSIUM CHLORIDE, CALCIUM CHLORIDE: 600; 310; 30; 20 INJECTION, SOLUTION INTRAVENOUS at 13:34

## 2022-04-21 RX ADMIN — Medication 5 MG: at 14:08

## 2022-04-21 RX ADMIN — HYDROMORPHONE HYDROCHLORIDE 0.5 MG: 1 INJECTION, SOLUTION INTRAMUSCULAR; INTRAVENOUS; SUBCUTANEOUS at 14:38

## 2022-04-21 RX ADMIN — Medication 10 MG: at 12:54

## 2022-04-21 RX ADMIN — FENTANYL CITRATE 150 MCG: 50 INJECTION, SOLUTION INTRAMUSCULAR; INTRAVENOUS at 12:29

## 2022-04-21 RX ADMIN — ACETAMINOPHEN 1000 MG: 10 INJECTION, SOLUTION INTRAVENOUS at 12:40

## 2022-04-21 RX ADMIN — Medication 20 MG: at 13:05

## 2022-04-21 RX ADMIN — SODIUM CHLORIDE, SODIUM LACTATE, POTASSIUM CHLORIDE, CALCIUM CHLORIDE: 600; 310; 30; 20 INJECTION, SOLUTION INTRAVENOUS at 11:50

## 2022-04-21 RX ADMIN — LIDOCAINE HYDROCHLORIDE 3 ML: 20 INJECTION, SOLUTION INFILTRATION; PERINEURAL at 12:32

## 2022-04-21 NOTE — DISCHARGE INSTRUCTIONS
You received 975 mg of acetaminophen (Tylenol) at 12:40 PM. Please do not take an additional dose of Tylenol until after 6:40 PM    Do not exceed 4,000 mg of acetaminophen during a 24 hour period and keep in mind that acetaminophen can also be found in many over-the-counter cold medications as well as narcotics that may be given for pain.

## 2022-04-21 NOTE — OP NOTE
Operative Note    Name:  Zachariah Wilkerson JrAmbrocio  Location: [facility]  Procedure Date:  4/21/2022  PCP:  Justen Waller    Surgery Performed:  Procedure/Surgery Information   Procedure: Procedure(s):  HERNIORRHAPHY, INGUINAL, LAPAROSCOPIC   Surgeon(s): Surgeon(s) and Role:     * Bunny Naranjo MD - Primary   Specimens: ID Type Source Tests Collected by Time Destination   1 :  Tissue Hernia Sac, Inguinal, Left SURGICAL PATHOLOGY EXAM Bunny Naranjo MD 4/21/2022  1:46 PM                Pre-Procedure Diagnosis:  Inguinal hernia without obstruction or gangrene, recurrence not specified, unspecified laterality [K40.90]    Post-Procedure Diagnosis:    Inguinal hernia without obstruction or gangrene, recurrence not specified, unspecified laterality [K40.90]    Anesthesia:  General     Past Medical History:   Diagnosis Date     BPH (benign prostatic hypertrophy)      Diverticula, colon      Diverticulosis      DJD (degenerative joint disease) of hip      Gastro-oesophageal reflux disease      GERD (gastroesophageal reflux disease)      Hyperlipidemia      Increased pressure in the eye      Prostate cancer (H)      Restless leg syndrome        Patient Active Problem List    Diagnosis Date Noted     Unilateral inguinal hernia 04/18/2022     Priority: Medium     OA (osteoarthritis) of hip 09/05/2013     Priority: Medium       Findings:  A left inguinal hernia with an incarcerated segment of omentum.    Operative Report:    The patient was brought to the operating room placed in the supine position and given general endotracheal anesthesia.  She was sterilely prepped and draped in the usual surgical fashion.    Curvilinear incision was made beneath the umbilicus the subcu tissues were dissected through bluntly with S retractors and the superficial rectus sheath fascia was scored with a scalpel and opened with Metzenbaum scissors.  The underlying rectus muscle was retracted laterally and superficially and  the dilation balloon catheter was advanced superficial to the deep rectus sheath but deep to the rectus muscle.  Under direct visualization of 0  laparoscope the dilation balloon was brought up with 20 pumps of air.  The dilation balloon catheter was replaced with a pneumatic footplate trocar.  The balloon at the end of the trocar was inflated and a pneumo-pre-peritoneum was brought up to 15 mmHg.  2  5 mm trochars were brought in under direct visualization in the lower midline.  The preperitoneal space was dissected out with blunt dissection and a hernia defect was noted just lateral to the epigastric vessels along side the spermatic cord, consistant with an indirect hernia.  This dissection was quite challenging particularly medially as the patient has had a robotic prostatectomy in the past.  The hernia sac was dissected free from the spermatic cord and reduced from the internal inguinal ring.  This hernia defect and hernia sac were slightly unusual and that it had incarcerated segment of omentum.  As I retracted this hernia sac out of the internal inguinal ring it looked very much like a testicle.  We then had to manually hold the left testicle and make sure that was intact as I dissected this out.  I was able to dissected all around the hernia sac and then resected this part of the hernia sac with the incarcerated omental content which I brought out and sent for specimen.  I closed the defect with a with an Endoloop suture.  Is having trouble with visualization secondary to a pneumoperitoneum and so I advanced a 5 mm trocar into the intra-abdominal cavity utilizing a Optiview technique.  Once that trocar was in place is able to decompress the abdominal cavity with that 5 mm trocar.  I peeled the peritoneum well back and away from all the inguinal structures.   the peritoneum was reflected back from the pelvic floor and a ProGrip mesh was inserted and was advanced into the preperitoneal space.  It was unfurled  in the appropriate orientation 40 mL of local anesthesia were instilled into that preperitoneal space and the pneumo preperitoneum was deflated.    After the mesh was in place I put the laparoscope back into the intra-abdominal cavity and looked at the repair the repair looked to be nicely intact I cannot appreciate any evidence of any herniation after this repair was performed and I did not see any obvious segments of mesh extending down into or exposed to the intraperitoneal cavity.  The pneumoperitoneum was deflated and a 5 mm trochars were removed.    Closure was undertaken I checked in the intra-peritoneal space while closing the umbilical trocar to release any intraperitoneal air.  I then closed the abdominal wall.  First the peritoneum and then the fascia with 0 Vicryl suture.  I closed the skin with a running 4-0 subcuticular Monocryl suture.  The 2, 5 mm trocar sites were both closed at the level of the skin with a 4-0 subcu Monocryl stitch.  The wounds were dressed with Telfa and Tegaderm    Estimated Blood Loss:   5 cc       Drains:        Implants:  Implant Name Type Inv. Item Serial No.  Lot No. LRB No. Used Action   LAP PROGRIP [id number] - [id number] Mesh Lap ProGrip [id number]  [employer] [id number] Left 1 Implanted       Complications:    None     Note to coders: Given the prior prostate surgery this procedure was much more difficult than usual and that it did not allow the tissue planes to open up as I usually see them with this approach.  The surgery took me about twice the time that it usually does at hour and a half and I would list this with a modifier 22.    Bunny Naranjo MD     Date: 4/21/2022  Time: 5:18 PM

## 2022-04-21 NOTE — H&P
HISTORY AND PHYSICAL UPDATE:    I have assessed the patient and evaluated the chart and and verify the patient's clinical status has not changed since our last documentation.  The patient is ready to move forward with the planned surgery.  Lungs: Clear to auscultation  Heart: Regular rate and rhythm    HPI:  This is a 71 year old adult here today with concerns of pain and bulging in Zachariah Wilkerson Jr.'s Left groin. Zachariah Wilkerson Jr. has noted this for the past 4 weeks. The discomfort he is experiencing is a deep gnawing pain that is worse toward the end of the day.  He has felt the mass growing over the last few weeks.  He was able to reduce it a bit on his own. .     Allergies:Patient has no known allergies.     Past Medical History        Past Medical History:   Diagnosis Date     BPH (benign prostatic hypertrophy)       Diverticula, colon       Diverticulosis       DJD (degenerative joint disease) of hip       Gastro-oesophageal reflux disease       GERD (gastroesophageal reflux disease)       Glaucoma       Hyperlipidemia       Prostate cancer (H)       Restless leg syndrome              Past Surgical History         Past Surgical History:   Procedure Laterality Date     ARTHROPLASTY HIP         ARTHROPLASTY MINIMALLY INVASIVE HIP   9/5/2013     Procedure: ARTHROPLASTY MINIMALLY INVASIVE HIP;  Left Total Hip Arthroplasty Minimally Invasive Two Incision;  Surgeon: Justen Hooper MD;  [facility]     ARTHROSCOPY KNEE         JOINT REPLACEMENT         both hips     LAPAROSCOPIC RETROPUBIC PROSTATECTOMY   09/28/2016     SHOULDER SURGERY         SHOULDER SURGERY         both shoulders     [facility] TOTAL HIP ARTHROPLASTY         Description: Total Hip Replacement;  Recorded: 06/21/2010;  Comments: May 6, 2010            CURRENT MEDS:  Current Outpatient Prescriptions          Current Outpatient Medications   Medication Sig Dispense Refill     LANsoprazole (PREVACID) 30 MG capsule Take 30 mg by mouth daily          "latanoprost (XALATAN) 0.005 % ophthalmic solution Place 1 drop into both eyes At Bedtime         omeprazole (PRILOSEC) 20 MG DR capsule TAKE 2 CAPSULES BY MOUTH DAILY BEFORE BREAKFAST 180 capsule 0            Family History         Family History   Problem Relation Age of Onset     Dementia Mother       Cerebrovascular Disease Father       Crohn's Disease Father       Prostate Cancer Father       Lung Cancer Sister       No Known Problems Son       Prostate Cancer Paternal Uncle       No Known Problems Sister       No Known Problems Son       No Known Problems Son       No Known Problems Son       Prostate Cancer Paternal Uncle               reports that Zachariah Wilkerson Jr. has never smoked. Zachariah Wilkerson Jr. has never used smokeless tobacco. Zachariah Wilkerson Jr. reports current alcohol use. Zachariah Wilkerson Jr. reports that Zachariah Wilkerson Jr. does not use drugs.     Review of Systems -   10 point Review of systems is negative except for; as mentioned above in HPI and PMHx     Ht 1.778 m (5' 10\")   Wt 89.4 kg (197 lb)   BMI 28.27 kg/m       EXAM:  GENERAL: Well developed adult  HEENT: EOMI, Anicteric Sclera  NECK:  No masses, good flexion and extention of the neck  CARDIAC: RRR w/out murmur   CHEST/LUNG: Clear  ABDOMEN: Left inguinal hernia.   GENITAL: Both testicles descended without masses  NEURO: He is ambulatory with good strength in both legs.     IMAGES: None     Assessment/Plan: Pt with a Left inguinal hernia. I discussed this at length with Zachariah Wilkerson Jr..  I went over conservative management as well as surgical treatment for hernias.   I would reccomend a laparoscopic inguinal hernia repair, understanding the possibility of converting to an open operation.   I went over the small risks of surgery including but not limited to bleeding and infection. I discussed the expected recovery time as well. We will schedule this hernia repair at his earliest convenience.      Bunny Naranjo  [facility]; surgeons  [phone]

## 2022-04-21 NOTE — ANESTHESIA CARE TRANSFER NOTE
Patient: Zachariah Wilkerson Jr.    Procedure: Procedure(s):  HERNIORRHAPHY, INGUINAL, LAPAROSCOPIC       Diagnosis: Inguinal hernia without obstruction or gangrene, recurrence not specified, unspecified laterality [K40.90]  Diagnosis Additional Information: No value filed.    Anesthesia Type:   General     Note:    Oropharynx: oropharynx clear of all foreign objects  Level of Consciousness: drowsy  Oxygen Supplementation: face mask  Level of Supplemental Oxygen (L/min / FiO2): 10  Independent Airway: airway patency satisfactory and stable  Dentition: dentition unchanged  Vital Signs Stable: post-procedure vital signs reviewed and stable  Report to RN Given: handoff report given  Patient transferred to: PACU    Handoff Report: Identifed the Patient, Identified the Reponsible Provider, Reviewed the pertinent medical history, Discussed the surgical course, Reviewed Intra-OP anesthesia mangement and issues during anesthesia, Set expectations for post-procedure period and Allowed opportunity for questions and acknowledgement of understanding      Vitals:  Vitals Value Taken Time   /91 04/21/22 1426   Temp 36.1  C (97  F) 04/21/22 1426   Pulse 74 04/21/22 1427   Resp 15    SpO2 100 % 04/21/22 1427   Vitals shown include unvalidated device data.    Electronically Signed By: JAQUELINE Lopez CRNA  April 21, 2022  2:30 PM

## 2022-04-21 NOTE — ANESTHESIA POSTPROCEDURE EVALUATION
Patient: Zachariah Wilkerson Jr.    Procedure: Procedure(s):  HERNIORRHAPHY, INGUINAL, LAPAROSCOPIC       Anesthesia Type:  General    Note:     Postop Pain Control: Uneventful            Sign Out: Well controlled pain   PONV: No   Neuro/Psych: Uneventful            Sign Out: Acceptable/Baseline neuro status   Airway/Respiratory: Uneventful            Sign Out: Acceptable/Baseline resp. status   CV/Hemodynamics: Uneventful            Sign Out: Acceptable CV status; No obvious hypovolemia; No obvious fluid overload   Other NRE: NONE   DID A NON-ROUTINE EVENT OCCUR? No           Last vitals:  Vitals Value Taken Time   /81 04/21/22 1515   Temp 97  F (36.1  C) 04/21/22 1426   Pulse 68 04/21/22 1529   Resp 16 04/21/22 1515   SpO2 94 % 04/21/22 1529   Vitals shown include unvalidated device data.    Electronically Signed By: Susie Vincent MD  April 21, 2022  3:33 PM

## 2022-04-21 NOTE — ANESTHESIA PREPROCEDURE EVALUATION
patientPAnesthesia Pre-Procedure Evaluation    Patient: Zachariah Wilkerson Jr.   MRN: [id number] : 1950        Procedure : Procedure(s):  HERNIORRHAPHY, INGUINAL, LAPAROSCOPIC          Past Medical History:   Diagnosis Date     BPH (benign prostatic hypertrophy)      Diverticula, colon      Diverticulosis      DJD (degenerative joint disease) of hip      Gastro-oesophageal reflux disease      GERD (gastroesophageal reflux disease)      Hyperlipidemia      Increased pressure in the eye      Prostate cancer (H)      Restless leg syndrome       Past Surgical History:   Procedure Laterality Date     ARTHROPLASTY MINIMALLY INVASIVE HIP  2013    Procedure: ARTHROPLASTY MINIMALLY INVASIVE HIP;  Left Total Hip Arthroplasty Minimally Invasive Two Incision;  Surgeon: Justen Hooper MD;  [facility]     ARTHROSCOPY KNEE Left      CATARACT EXTRACTION Bilateral 2018     hand surgeries Bilateral     1982, 1982 and 1983 for tendon repair     LAPAROSCOPIC RETROPUBIC PROSTATECTOMY  2016     SHOULDER SURGERY Bilateral     both shoulders for impingement, about  right,  for left     ZZC TOTAL HIP ARTHROPLASTY Right     Description: Total Hip Replacement;  Recorded: 2010;  Comments: May 6, 2010      No Known Allergies   Social History     Tobacco Use     Smoking status: Never Smoker     Smokeless tobacco: Never Used   Substance Use Topics     Alcohol use: Yes     Comment: Alcoholic Drinks/day: Occasional      Wt Readings from Last 1 Encounters:   22 89.4 kg (197 lb)        Anesthesia Evaluation   Pt has had prior anesthetic. Type: General.    History of anesthetic complications: Patient states he always wakes up with significant pain and recovery has to catch up and treat aggressively.       ROS/MED HX  ENT/Pulmonary:  - neg pulmonary ROS     Neurologic:  - neg neurologic ROS     Cardiovascular:     (+) Dyslipidemia -----    METS/Exercise Tolerance: >4 METS    Hematologic:   - neg hematologic  ROS     Musculoskeletal:   (+) arthritis,     GI/Hepatic:     (+) GERD, Asymptomatic on medication,     Renal/Genitourinary:  - neg Renal ROS     Endo:  - neg endo ROS     Psychiatric/Substance Use:  - neg psychiatric ROS     Infectious Disease:  - neg infectious disease ROS     Malignancy:       Other:            Physical Exam    Airway        Mallampati: III    Neck ROM: full     Respiratory Devices and Support         Dental       (+) chipped      Cardiovascular          Rhythm and rate: regular     Pulmonary           breath sounds clear to auscultation           OUTSIDE LABS:  CBC:   Lab Results   Component Value Date    HGB 16.0 04/18/2022    HGB 8.6 (L) 09/08/2013     09/08/2013     09/05/2013     BMP:   Lab Results   Component Value Date     04/18/2022     09/07/2013    POTASSIUM 4.8 04/18/2022    POTASSIUM 4.0 09/07/2013    CHLORIDE 103 04/18/2022    CHLORIDE 103 09/07/2013    CO2 25 04/18/2022    CO2 26 09/07/2013    BUN 15 04/18/2022    BUN 14 09/07/2013    CR 1.10 04/18/2022    CR 1.00 09/07/2013     04/18/2022     (H) 09/07/2013     COAGS: No results found for: PTT, INR, FIBR  POC:   Lab Results   Component Value Date     (H) 05/07/2010     HEPATIC:   Lab Results   Component Value Date    ALBUMIN 3.7 04/18/2022     OTHER:   Lab Results   Component Value Date    KARIN 9.4 04/18/2022    PHOS 3.1 04/18/2022       Anesthesia Plan    ASA Status:  2   NPO Status:  NPO Appropriate    Anesthesia Type: General.     - Airway: ETT   Induction: Propofol, Intravenous.   Maintenance: TIVA.        Consents    Anesthesia Plan(s) and associated risks, benefits, and realistic alternatives discussed. Questions answered and patient/representative(s) expressed understanding.     - Discussed: Risks, Benefits and Alternatives for the PROCEDURE were discussed     - Discussed with:  Patient      - Patient is DNR/DNI Status: No         Postoperative Care    Pain  management: Multi-modal analgesia.   PONV prophylaxis: Dexamethasone or Solumedrol, Ondansetron (or other 5HT-3)     Comments:    Other Comments:     Dilaudid 0.5 to 1 mg  Ketamine            Susie Vincent MD

## 2022-04-21 NOTE — ANESTHESIA PROCEDURE NOTES
Airway       Patient location during procedure: OR       Procedure Start/Stop Times: 4/21/2022 12:37 PM  Staff -        CRNA: Laura Alaniz APRN CRNA       Performed By: CRNA  Consent for Airway        Urgency: elective  Indications and Patient Condition       Indications for airway management: kassie-procedural       Induction type:intravenous       Mask difficulty assessment: 2 - vent by mask + OA or adjuvant +/- NMBA    Final Airway Details       Final airway type: endotracheal airway       Successful airway: ETT - single  Endotracheal Airway Details        ETT size (mm): 7.5       Cuffed: yes       Successful intubation technique: direct laryngoscopy       DL Blade Type: MAC 4       Grade View of Cords: 1       Adjucts: stylet and tooth guard       Position: Right       Measured from: gums/teeth       Secured at (cm): 22       Bite block used: Soft    Post intubation assessment        Placement verified by: capnometry and equal breath sounds        Number of attempts at approach: 1       Ease of procedure: easy       Dentition: Intact    Medication(s) Administered   Medication Administration Time: 4/21/2022 12:37 PM

## 2022-05-02 ENCOUNTER — TELEPHONE (OUTPATIENT)
Dept: SURGERY | Facility: CLINIC | Age: 72
End: 2022-05-02
Payer: MEDICARE

## 2022-05-02 NOTE — TELEPHONE ENCOUNTER
Spoke to Zachariah. He describes his groin pain at a 6/10. He had a left inguinal hernia repair 4/21 by Dr. Naranjo. He says the pain has increased and he feels something is not right. No drainage or erythema from the incisions. No fever or chills. Has normal bowel movements. He also complains of pain with urinating. He is able to empty his bladder completely. I told him I can scheduled him with Dr. Naranjo 5/4 but he should go to walk in care for his issue with urinating. He expressed understanding.       [facility]      Khadijah Canales RN  [employer]  General Surgery  [address]  [address] [place] [address]  [email or web address]   Office:[phone]  Employed by [employer],

## 2022-05-04 ENCOUNTER — OFFICE VISIT (OUTPATIENT)
Dept: SURGERY | Facility: CLINIC | Age: 72
End: 2022-05-04
Payer: MEDICARE

## 2022-05-04 VITALS — DIASTOLIC BLOOD PRESSURE: 76 MMHG | SYSTOLIC BLOOD PRESSURE: 138 MMHG

## 2022-05-04 DIAGNOSIS — K40.90 HERNIA, INGUINAL, LEFT: Primary | ICD-10-CM

## 2022-05-04 DIAGNOSIS — R10.32 LEFT INGUINAL PAIN: ICD-10-CM

## 2022-05-04 DIAGNOSIS — R30.0 DYSURIA: ICD-10-CM

## 2022-05-04 PROCEDURE — 99024 POSTOP FOLLOW-UP VISIT: CPT | Performed by: SURGERY

## 2022-05-04 RX ORDER — CIPROFLOXACIN 500 MG/1
500 TABLET, FILM COATED ORAL 2 TIMES DAILY
Qty: 6 TABLET | Refills: 0 | Status: SHIPPED | OUTPATIENT
Start: 2022-05-04 | End: 2022-05-06

## 2022-05-04 RX ORDER — HYDROCODONE BITARTRATE AND ACETAMINOPHEN 5; 325 MG/1; MG/1
1 TABLET ORAL EVERY 6 HOURS PRN
Qty: 10 TABLET | Refills: 0 | Status: SHIPPED | OUTPATIENT
Start: 2022-05-04 | End: 2022-05-07

## 2022-05-04 NOTE — PROGRESS NOTES
HPI: Pt is here for follow up of a Left Ing Hernia repair on 4/21/22.   Zachariah Wilkerson Jr.had quite a lot of pain early on after surgery but his pain has gotten worse in the last 3 days, now 2 weeks after surgery.    No difficulties with the surgical wound/wounds.  Zachariah Wilkerson Jr. is eating well and denies fever and chills.         /76 (BP Location: Right arm, Patient Position: Sitting, Cuff Size: Adult Small)   EXAM:  GENERAL:Appears well  ABDOMEN:  Soft, +BS  SURGICAL WOUNDS:  Incisions healing well, no enduration or drainage.   Very tender along L spermatic cord and even in the testicle.  No evidence of a recurrent hernia on exam.          Assessment/Plan:  Struggling with his lap Ing hernia repair with post op pain.  The surgery was complicated by his prior prostatectomy.  Still he should be feeling better not worse at this point.     CT scan of pelvis to insure there is no issue with the bladder or the hernia repair site.       Bunny Naranjo MD ,MD  [facility]: Department of Surgery

## 2022-05-04 NOTE — LETTER
5/4/2022         RE: Zachariah Wilkerson Jr.  [address]  [facility] [address]        Dear Colleague,    Thank you for referring your patient, Zachariah Wilkerson Jr., to the [facility] [place]. Please see a copy of my visit note below.    HPI: Pt is here for follow up of a Left Ing Hernia repair on 4/21/22.   Zachariah Wilkerson Jr.had quite a lot of pain early on after surgery but his pain has gotten worse in the last 3 days, now 2 weeks after surgery.    No difficulties with the surgical wound/wounds.  Zachariah Wilkerson Jr. is eating well and denies fever and chills.         /76 (BP Location: Right arm, Patient Position: Sitting, Cuff Size: Adult Small)   EXAM:  GENERAL:Appears well  ABDOMEN:  Soft, +BS  SURGICAL WOUNDS:  Incisions healing well, no enduration or drainage.   Very tender along L spermatic cord and even in the testicle.  No evidence of a recurrent hernia on exam.          Assessment/Plan:  Struggling with his lap Ing hernia repair with post op pain.  The surgery was complicated by his prior prostatectomy.  Still he should be feeling better not worse at this point.     CT scan of pelvis to insure there is no issue with the bladder or the hernia repair site.       Bunny Naranjo MD ,MD  [facility]: Department of Surgery        Again, thank you for allowing me to participate in the care of your patient.        Sincerely,        Bunny Naranjo MD

## 2022-05-06 ENCOUNTER — HOSPITAL ENCOUNTER (OUTPATIENT)
Dept: CT IMAGING | Facility: CLINIC | Age: 72
Discharge: HOME | End: 2022-05-06
Attending: SURGERY | Admitting: SURGERY
Payer: MEDICARE

## 2022-05-06 DIAGNOSIS — R30.0 DYSURIA: ICD-10-CM

## 2022-05-06 DIAGNOSIS — R10.32 LEFT INGUINAL PAIN: ICD-10-CM

## 2022-05-06 DIAGNOSIS — K57.32 DIVERTICULITIS OF COLON: Primary | ICD-10-CM

## 2022-05-06 DIAGNOSIS — K40.90 HERNIA, INGUINAL, LEFT: ICD-10-CM

## 2022-05-06 LAB — RADIOLOGIST FLAGS: ABNORMAL

## 2022-05-06 PROCEDURE — 74177 CT ABD & PELVIS W/CONTRAST: CPT | Mod: MG

## 2022-05-06 PROCEDURE — 250N000011 HC RX IP 250 OP 636: Performed by: SURGERY

## 2022-05-06 RX ORDER — IOPAMIDOL 755 MG/ML
100 INJECTION, SOLUTION INTRAVASCULAR ONCE
Status: COMPLETED | OUTPATIENT
Start: 2022-05-06 | End: 2022-05-06

## 2022-05-06 RX ORDER — CIPROFLOXACIN 500 MG/1
500 TABLET, FILM COATED ORAL 2 TIMES DAILY
Qty: 14 TABLET | Refills: 0 | Status: SHIPPED | OUTPATIENT
Start: 2022-05-06 | End: 2023-12-13

## 2022-05-06 RX ORDER — METRONIDAZOLE 500 MG/1
500 TABLET ORAL 3 TIMES DAILY
Qty: 21 TABLET | Refills: 0 | Status: SHIPPED | OUTPATIENT
Start: 2022-05-06 | End: 2023-12-13

## 2022-05-06 RX ADMIN — IOPAMIDOL 100 ML: 755 INJECTION, SOLUTION INTRAVENOUS at 09:26

## 2022-07-05 ENCOUNTER — LAB (OUTPATIENT)
Dept: LAB | Facility: CLINIC | Age: 72
End: 2022-07-05
Payer: MEDICARE

## 2022-07-05 ENCOUNTER — TELEPHONE (OUTPATIENT)
Dept: FAMILY MEDICINE | Facility: CLINIC | Age: 72
End: 2022-07-05

## 2022-07-05 DIAGNOSIS — M54.50 ACUTE RIGHT-SIDED LOW BACK PAIN WITHOUT SCIATICA: ICD-10-CM

## 2022-07-05 DIAGNOSIS — M54.50 ACUTE RIGHT-SIDED LOW BACK PAIN WITHOUT SCIATICA: Primary | ICD-10-CM

## 2022-07-05 LAB
ALBUMIN UR-MCNC: NEGATIVE MG/DL
APPEARANCE UR: CLEAR
BILIRUB UR QL STRIP: NEGATIVE
COLOR UR AUTO: YELLOW
GLUCOSE UR STRIP-MCNC: NEGATIVE MG/DL
HGB UR QL STRIP: NEGATIVE
KETONES UR STRIP-MCNC: NEGATIVE MG/DL
LEUKOCYTE ESTERASE UR QL STRIP: NEGATIVE
NITRATE UR QL: NEGATIVE
PH UR STRIP: 5.5 [PH] (ref 5–8)
SP GR UR STRIP: 1.02 (ref 1–1.03)
UROBILINOGEN UR STRIP-ACNC: 0.2 E.U./DL

## 2022-07-05 PROCEDURE — 81003 URINALYSIS AUTO W/O SCOPE: CPT

## 2022-07-05 NOTE — TELEPHONE ENCOUNTER
Pt with onset of pain in the right low back to right flank quite acutely 3 days ago.  The pain waxes and wanes and gets quite intense.  Minimal change with position though finds it hard to get comfortable in any position.  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   States he hardly got through it today.   The pain is very different from any muscle pain he has previously experienced after playing golf or tennis.  Heat feels better than ice.  Ibuprofen of very little help.   He has noted no change in urination.  No prior h/o stones.  Prior hernia repair was left inguinal.    The UA is negative.  The atypical nature of this pain for musculoskeletal pain combined with severity and duration makes it important to rule out a kidney stone.    Plan is for a non-contrast right kidney-ureter stone study.

## 2022-07-06 ENCOUNTER — HOSPITAL ENCOUNTER (OUTPATIENT)
Dept: CT IMAGING | Facility: HOSPITAL | Age: 72
Discharge: HOME | End: 2022-07-06
Attending: FAMILY MEDICINE | Admitting: FAMILY MEDICINE
Payer: MEDICARE

## 2022-07-06 DIAGNOSIS — M54.50 ACUTE RIGHT-SIDED LOW BACK PAIN WITHOUT SCIATICA: ICD-10-CM

## 2022-07-06 DIAGNOSIS — M54.50 ACUTE RIGHT-SIDED LOW BACK PAIN WITHOUT SCIATICA: Primary | ICD-10-CM

## 2022-07-06 PROCEDURE — 74150 CT ABDOMEN W/O CONTRAST: CPT | Mod: MG

## 2022-07-06 RX ORDER — PREDNISONE 20 MG/1
TABLET ORAL
Qty: 18 TABLET | Refills: 0 | Status: SHIPPED | OUTPATIENT
Start: 2022-07-06 | End: 2022-07-08

## 2022-07-08 DIAGNOSIS — M54.50 ACUTE BILATERAL LOW BACK PAIN, UNSPECIFIED WHETHER SCIATICA PRESENT: Primary | ICD-10-CM

## 2022-07-08 DIAGNOSIS — K21.9 CHRONIC GERD: Primary | ICD-10-CM

## 2022-07-08 RX ORDER — PREDNISONE 20 MG/1
40 TABLET ORAL DAILY
Qty: 42 TABLET | Refills: 0 | Status: SHIPPED | OUTPATIENT
Start: 2022-07-08 | End: 2022-07-15

## 2022-07-08 NOTE — CONFIDENTIAL NOTE
Pt with history of GERD on omeprazole.  Last Upper Endoscopy about 5 years ago.  Will order a follow up Upper Endoscopy to assure GERD stable.    Also pt periodically getting bouts of low back pain responding to brief courses of prednisone.    rx for prednisone 40 mg daily for a week for recurrent episodes of severe lbp.

## 2022-07-19 DIAGNOSIS — M54.50 ACUTE LOW BACK PAIN WITHOUT SCIATICA, UNSPECIFIED BACK PAIN LATERALITY: Primary | ICD-10-CM

## 2022-07-19 DIAGNOSIS — M54.50 ACUTE RIGHT-SIDED LOW BACK PAIN WITHOUT SCIATICA: ICD-10-CM

## 2022-07-19 RX ORDER — PREDNISONE 20 MG/1
TABLET ORAL
Qty: 36 TABLET | Refills: 1 | Status: SHIPPED | OUTPATIENT
Start: 2022-07-19 | End: 2023-12-13

## 2022-07-19 RX ORDER — LIDOCAINE 4 G/G
PATCH TOPICAL
Qty: 30 PATCH | Refills: 1 | Status: SHIPPED | OUTPATIENT
Start: 2022-07-19 | End: 2024-01-24

## 2022-08-08 ENCOUNTER — TRANSFERRED RECORDS (OUTPATIENT)
Dept: HEALTH INFORMATION MANAGEMENT | Facility: CLINIC | Age: 72
End: 2022-08-08

## 2022-10-22 ENCOUNTER — HEALTH MAINTENANCE LETTER (OUTPATIENT)
Age: 72
End: 2022-10-22

## 2022-12-01 ENCOUNTER — LAB (OUTPATIENT)
Dept: FAMILY MEDICINE | Facility: CLINIC | Age: 72
End: 2022-12-01
Attending: FAMILY MEDICINE
Payer: MEDICARE

## 2022-12-01 DIAGNOSIS — M79.10 MYALGIA: Primary | ICD-10-CM

## 2022-12-01 DIAGNOSIS — M79.10 MYALGIA: ICD-10-CM

## 2022-12-01 LAB
FLUAV AG SPEC QL IA: NEGATIVE
FLUBV AG SPEC QL IA: NEGATIVE
SARS-COV-2 RNA RESP QL NAA+PROBE: NEGATIVE

## 2022-12-01 PROCEDURE — U0005 INFEC AGEN DETEC AMPLI PROBE: HCPCS

## 2022-12-01 PROCEDURE — 87804 INFLUENZA ASSAY W/OPTIC: CPT

## 2022-12-01 PROCEDURE — U0003 INFECTIOUS AGENT DETECTION BY NUCLEIC ACID (DNA OR RNA); SEVERE ACUTE RESPIRATORY SYNDROME CORONAVIRUS 2 (SARS-COV-2) (CORONAVIRUS DISEASE [COVID-19]), AMPLIFIED PROBE TECHNIQUE, MAKING USE OF HIGH THROUGHPUT TECHNOLOGIES AS DESCRIBED BY CMS-2020-01-R: HCPCS

## 2022-12-01 NOTE — LETTER
December 4, 2022      Zachariah Wilkerson Jr.  [address] [facility] [address]        Dear  Rock,  We are writing to inform you of your test results.    Jhonatan Sarabia:  Sure you've seen this by now.  All testing negative.  Hope you are beginning to feel better.      Resulted Orders   Influenza A & B Antigen - Clinic Collect   Result Value Ref Range    Influenza A antigen Negative Negative    Influenza B antigen Negative Negative    Narrative    Test results must be correlated with clinical data. If necessary, results should be confirmed by a molecular assay or viral culture.   Symptomatic; Unknown COVID-19 Virus (Coronavirus) by PCR Nose   Result Value Ref Range    SARS CoV2 PCR Negative Negative      Comment:      NEGATIVE: SARS-CoV-2 (COVID-19) RNA not detected, presumed negative.    Narrative    Testing was performed using the [email or web address] SARS-CoV-2 Assay on the  [employer] Instrument System. Additional information about this  Emergency Use Authorization (EUA) assay can be found via the Lab  Guide. This test should be ordered for the detection of SARS-CoV-2 in  individuals who meet SARS-CoV-2 clinical and/or epidemiological  criteria. Test performance is unknown in asymptomatic patients. This  test is for in vitro diagnostic use under the FDA EUA for  laboratories certified under CLIA to perform high complexity testing.  This test has not been FDA cleared or approved. A negative result  does not rule out the presence of PCR inhibitors in the specimen or  target RNA in concentration below the limit of detection for the  assay. The possibility of a false negative should be considered if  the patient's recent exposure or clinical presentation suggests  COVID-19. This test was validated by the [facility]. This laboratory is certified under  the Clinical Laboratory Improvement Amendments of 1988 (CLIA-88) as  qualified to perform high complexity laboratory testing.       If  you have any questions or concerns, please call the clinic at the number listed above.       Sincerely,      Justen Waller MD

## 2022-12-01 NOTE — LETTER
December 1, 2022      Zachariah Wilkerson Jr.  [address] [facility] [address]        Dear  Rock,  We are writing to inform you of your test results.    Jhonatan Sarabia:  Here is your flu screen.  The Covid screening is pending.  Hopefully back by tomorrow.  Call and ask for results if not visible on MyChart.    Resulted Orders   Influenza A & B Antigen - Clinic Collect   Result Value Ref Range    Influenza A antigen Negative Negative    Influenza B antigen Negative Negative    Narrative    Test results must be correlated with clinical data. If necessary, results should be confirmed by a molecular assay or viral culture.       If you have any questions or concerns, please call the clinic at the number listed above.       Sincerely,      Justen Waller MD

## 2023-04-01 ENCOUNTER — HEALTH MAINTENANCE LETTER (OUTPATIENT)
Age: 73
End: 2023-04-01

## 2023-09-24 DIAGNOSIS — H91.93 DECREASED HEARING OF BOTH EARS: Primary | ICD-10-CM

## 2023-12-06 ENCOUNTER — OFFICE VISIT (OUTPATIENT)
Dept: AUDIOLOGY | Facility: CLINIC | Age: 73
End: 2023-12-06
Attending: FAMILY MEDICINE
Payer: MEDICARE

## 2023-12-06 DIAGNOSIS — H90.3 SENSORINEURAL HEARING LOSS (SNHL) OF BOTH EARS: Primary | ICD-10-CM

## 2023-12-06 DIAGNOSIS — H91.93 DECREASED HEARING OF BOTH EARS: ICD-10-CM

## 2023-12-06 PROCEDURE — 92557 COMPREHENSIVE HEARING TEST: CPT | Performed by: AUDIOLOGIST

## 2023-12-06 PROCEDURE — 92567 TYMPANOMETRY: CPT | Performed by: AUDIOLOGIST

## 2023-12-06 NOTE — PROGRESS NOTES
AUDIOLOGY REPORT    SUBJECTIVE:  Zachariah Wilkerson Jr. is a 73 year old adult who was seen in the Audiology Clinic at the [facility] for audiologic evaluation, referred by Justen Waller M.D.     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 He has difficulty understanding people when in a background of noise. He denies otalgia, otorrhea, aural fullness, tinnitus, dizziness, past ear surgery, family history of noise exposure, and prior use of amplification.    OBJECTIVE:  Abuse Screening:  Do you feel unsafe at home or work/school? No  Do you feel threatened by someone? No  Does anyone try to keep you from having contact with others, or doing things outside of your home? No  Physical signs of abuse present? No     Fall Risk Screen:  1. Have you fallen two or more times in the past year? No  2. Have you fallen and had an injury in the past year? No    Timed Up and Go Score (in seconds): not tested  Is patient a fall risk? No  Referral initiated: No  Fall Risk Assessment Completed by Audiology    Otoscopic exam indicates minimal cerumen right ear, clear left ear.     Pure Tone Thresholds assessed using conventional audiometry with good  reliability from 250-8000 Hz bilaterally using insert earphones and circumaural headphones     RIGHT:  normal hearing 250-3000 Hz sloping to moderate sensorineural hearing loss then rising to normal hearing.    LEFT:  normal hearing 250-3000 Hz sloping to moderate sensorineural hearing loss then rising to normal to mild.     Tympanogram:    RIGHT: normal eardrum mobility    LEFT:   normal eardrum mobility    Reflexes (reported by stimulus ear):   Could not test due to equipment limitations    Speech Reception Threshold:    RIGHT: 15 dB HL    LEFT:   5 dB HL  Word Recognition Score:     RIGHT: 100% at 60 dB HL using NU-6 recorded word list.    LEFT:   100% at 60 dB HL using NU-6 recorded word list.      ASSESSMENT:   Pure  tone audiometry showed normal hearing 250-3000 Hz sloping to moderate sensorineural hearing loss then rising to normal hearing in the right ear and normal hearing 250-3000 Hz sloping to moderate sensorineural hearing loss then rising to normal to mild hearing loss in the left ear.  Today s results were discussed with the patient in detail.     PLAN:  Discussed communication strategies with patient today. It is recommended that the patient return to have custom hearing protection made. He is interested in custom plugs for sleep and with filters for work. Return in 1 year to monitor hearing sensitivity. Please call this clinic with questions regarding these results or recommendations.      Dakota Parham., CCC-A  [place] Licensed Audiologist #[id number]

## 2023-12-13 ENCOUNTER — OFFICE VISIT (OUTPATIENT)
Dept: FAMILY MEDICINE | Facility: CLINIC | Age: 73
End: 2023-12-13
Payer: MEDICARE

## 2023-12-13 ENCOUNTER — TELEPHONE (OUTPATIENT)
Dept: FAMILY MEDICINE | Facility: CLINIC | Age: 73
End: 2023-12-13

## 2023-12-13 VITALS
TEMPERATURE: 97.5 F | RESPIRATION RATE: 16 BRPM | SYSTOLIC BLOOD PRESSURE: 138 MMHG | BODY MASS INDEX: 28.75 KG/M2 | HEIGHT: 70 IN | DIASTOLIC BLOOD PRESSURE: 92 MMHG | OXYGEN SATURATION: 95 % | WEIGHT: 200.8 LBS | HEART RATE: 69 BPM

## 2023-12-13 DIAGNOSIS — K57.32 DIVERTICULITIS OF COLON: ICD-10-CM

## 2023-12-13 DIAGNOSIS — R10.32 LLQ ABDOMINAL PAIN: Primary | ICD-10-CM

## 2023-12-13 DIAGNOSIS — R10.32 LLQ ABDOMINAL PAIN: ICD-10-CM

## 2023-12-13 LAB
ANION GAP SERPL CALCULATED.3IONS-SCNC: 7 MMOL/L (ref 7–15)
BUN SERPL-MCNC: 15.8 MG/DL (ref 8–23)
CALCIUM SERPL-MCNC: 9.6 MG/DL (ref 8.8–10.2)
CHLORIDE SERPL-SCNC: 102 MMOL/L (ref 98–107)
CREAT SERPL-MCNC: 1.15 MG/DL (ref 0.51–1.17)
DEPRECATED HCO3 PLAS-SCNC: 31 MMOL/L (ref 22–29)
EGFRCR SERPLBLD CKD-EPI 2021: 68 ML/MIN/1.73M2
ERYTHROCYTE [DISTWIDTH] IN BLOOD BY AUTOMATED COUNT: 12.9 % (ref 10–15)
GLUCOSE SERPL-MCNC: 89 MG/DL (ref 70–99)
HCT VFR BLD AUTO: 47.2 % (ref 35–53)
HGB BLD-MCNC: 15.9 G/DL (ref 11.7–17.7)
HOLD SPECIMEN: NORMAL
MCH RBC QN AUTO: 30.5 PG (ref 26.5–33)
MCHC RBC AUTO-ENTMCNC: 33.7 G/DL (ref 31.5–36.5)
MCV RBC AUTO: 90 FL (ref 78–100)
PLATELET # BLD AUTO: 206 10E3/UL (ref 150–450)
POTASSIUM SERPL-SCNC: 4.7 MMOL/L (ref 3.4–5.3)
RBC # BLD AUTO: 5.22 10E6/UL (ref 3.8–5.9)
SODIUM SERPL-SCNC: 140 MMOL/L (ref 135–145)
WBC # BLD AUTO: 8 10E3/UL (ref 4–11)

## 2023-12-13 PROCEDURE — 36415 COLL VENOUS BLD VENIPUNCTURE: CPT | Performed by: FAMILY MEDICINE

## 2023-12-13 PROCEDURE — 99213 OFFICE O/P EST LOW 20 MIN: CPT | Performed by: FAMILY MEDICINE

## 2023-12-13 PROCEDURE — 85027 COMPLETE CBC AUTOMATED: CPT | Performed by: FAMILY MEDICINE

## 2023-12-13 PROCEDURE — 80048 BASIC METABOLIC PNL TOTAL CA: CPT | Performed by: FAMILY MEDICINE

## 2023-12-13 RX ORDER — CIPROFLOXACIN 500 MG/1
500 TABLET, FILM COATED ORAL 2 TIMES DAILY
Qty: 14 TABLET | Refills: 0 | Status: SHIPPED | OUTPATIENT
Start: 2023-12-13 | End: 2024-01-24

## 2023-12-13 RX ORDER — METRONIDAZOLE 500 MG/1
500 TABLET ORAL 3 TIMES DAILY
Qty: 21 TABLET | Refills: 0 | Status: SHIPPED | OUTPATIENT
Start: 2023-12-13 | End: 2024-01-24

## 2023-12-13 NOTE — PATIENT INSTRUCTIONS
Metronidazole 500 mg three times a day for a week    Cipro 500 mg twice a day for a week    No alcohol on this regimen    Follow up with General Surgery to discuss Colonoscopy and recurrent diverticulitis.    If not improving then will get a ABD CT

## 2023-12-13 NOTE — LETTER
"December 13, 2023      Zachariah Dianeyue Ledesma.  [address] [facility] [address]        Dear  Rock,  We are writing to inform you of your test results.    Your test results fall within the expected range(s) or remain unchanged from previous results.  Please continue with current treatment plan.    Resulted Orders   CBC with platelets   Result Value Ref Range    WBC Count 8.0 4.0 - 11.0 10e3/uL    RBC Count 5.22 3.80 - 5.90 10e6/uL      Comment:      Sex Specific Reference Ranges:    Female  3.80-5.20  10e6/uL  Male    4.40-5.90  10e6/uL        Hemoglobin 15.9 11.7 - 17.7 g/dL      Comment:      Sex Specific Reference Ranges:    Female  11.7-15.7  g/dL  Male    13.3-17.7   g/dL      Hematocrit 47.2 35.0 - 53.0 %      Comment:      Sex Specific Reference Ranges:     Female  35.0-47.0 %   Male      40.0-53.0 %      MCV 90 78 - 100 fL    MCH 30.5 26.5 - 33.0 pg    MCHC 33.7 31.5 - 36.5 g/dL    RDW 12.9 10.0 - 15.0 %    Platelet Count 206 150 - 450 10e3/uL    Narrative    The generation of reference intervals for this test is currently based on binary male or female sex. If the electronic health record information indicates another gender identity or if Legal Sex is recorded as \"Unknown\", both male and female reference intervals are provided where applicable, and should be considered according to the individual's appropriate clinical context.   Basic metabolic panel  (Ca, Cl, CO2, Creat, Gluc, K, Na, BUN)   Result Value Ref Range    Sodium 140 135 - 145 mmol/L      Comment:      Reference intervals for this test were updated on 09/26/2023 to more accurately reflect our healthy population. There may be differences in the flagging of prior results with similar values performed with this method. Interpretation of those prior results can be made in the context of the updated reference intervals.     Potassium 4.7 3.4 - 5.3 mmol/L    Chloride 102 98 - 107 mmol/L    Carbon Dioxide (CO2) 31 (H) 22 - 29 mmol/L    Anion Gap " "7 7 - 15 mmol/L    Urea Nitrogen 15.8 8.0 - 23.0 mg/dL    Creatinine 1.15 0.51 - 1.17 mg/dL      Comment:      Male and Female  0-2 Months    0.31-0.88 mg/dL  2-12 Months   0.16-0.39 mg/dL  1-2 Years     0.18-0.35 mg/dL  3-4 Years     0.26-0.42 mg/dL  5-6 Years     0.29-0.47 mg/dL  7-8 Years     0.34-0.53 mg/dL  9-10 Years    0.33-0.64 mg/dL  11-12 Years   0.44-0.68 mg/dL  13-14 Years   0.46-0.77 mg/dL    Female  15 Years and older  0.51-0.95 mg/dL    Male  15 Years and older  0.67-1.17 mg/dL        GFR Estimate 68 >60 mL/min/1.73m2      Comment:      The generation of the estimated GFR is currently based on binary male or female sex. If the electronic health record information indicates another gender identity or if Legal Sex is recorded as \"Unknown\", GFR estimates are not automatically calculated, and application of GFR equations or a direct GFR measurement should be considered according to the individual's appropriate clinical context.    Calcium 9.6 8.8 - 10.2 mg/dL    Glucose 89 70 - 99 mg/dL    Narrative    The generation of reference intervals for this test is currently based on binary male or female sex. If the electronic health record information indicates another gender identity or if Legal Sex is recorded as \"Unknown\", both male and female reference intervals are provided where applicable, and should be considered according to the individual's appropriate clinical context.       If you have any questions or concerns, please call the clinic at the number listed above.       Sincerely,      Justen Waller MD            "

## 2023-12-13 NOTE — PROGRESS NOTES
"Mariola Soni is a 73 year old, presenting for the following health issues:  Abdominal Pain (Stomach ache's, labs were already drawn. )    For a couple months intermittent pain in the left lower quandrant.  Hits in spurts.  Sharp pain in the left lower quadrant.  No fever or chills.  No nausea or vomiting.  Some loose stools.  Last antibiotic use May 2022.   Colonoscopy in 2016 with multiple diverticula.        12/13/2023    10:52 AM   Additional Questions   Roomed by Zeina Mays CMA       History of Present Illness       Reason for visit:  Stomach pain  Symptom onset:  More than a month  Symptoms include:  Stomach pain  Symptom intensity:  Moderate  Symptom progression:  Staying the same  Had these symptoms before:  Yes  Has tried/received treatment for these symptoms:  Yes  Previous treatment was successful:  Yes  Prior treatment description:  Antibiotics  What makes it worse:  No  What makes it better:  No    Zachariah Wilkerson Jr. eats 0-1 servings of fruits and vegetables daily.Zachariah Wilkerson Jr. consumes 0 sweetened beverage(s) daily.Zachariah Wilkerson Jr. exercises with enough effort to increase Zachariah Wilkerson Jr.'s heart rate 20 to 29 minutes per day.  Zachariah Wilkerson Jr. exercises with enough effort to increase Zachariah Wilkerson Jr.'s heart rate 6 days per week.   Zachariah Wilkerson Jr. is taking medications regularly.                 Review of Systems         Objective    BP (!) 138/92 (BP Location: Left arm, Patient Position: Sitting, Cuff Size: Adult Regular)   Pulse 69   Temp 97.5  F (36.4  C) (Oral)   Resp 16   Ht 1.778 m (5' 10\")   Wt 91.1 kg (200 lb 12.8 oz)   SpO2 95%   BMI 28.81 kg/m    Body mass index is 28.81 kg/m .  Physical Exam   GENERAL: healthy, alert and no distress  RESP: lungs clear to auscultation - no rales, rhonchi or wheezes  CV: regular rate and rhythm, normal S1 S2, no S3 or S4, no murmur, click or rub, no peripheral edema and peripheral pulses strong  ABDOMEN: soft, nontender, " "without hepatosplenomegaly or masses, TENDERNESS LLQ and NO GUARDING OR REBOUND, bowel sounds normal, and no palpable or pulsatile masses  MS: no gross musculoskeletal defects noted, no edema    Lab Results   Component Value Date    WBC 8.0 12/13/2023     Lab Results   Component Value Date    RBC 5.22 12/13/2023     Lab Results   Component Value Date    HGB 15.9 12/13/2023    HGB 8.6 09/08/2013     Lab Results   Component Value Date    HCT 47.2 12/13/2023     No components found for: \"MCT\"  Lab Results   Component Value Date    MCV 90 12/13/2023     Lab Results   Component Value Date    MCH 30.5 12/13/2023     Lab Results   Component Value Date    MCHC 33.7 12/13/2023     Lab Results   Component Value Date    RDW 12.9 12/13/2023     Lab Results   Component Value Date     12/13/2023     09/08/2013        Encounter Diagnoses   Name Primary?    Diverticulitis of colon, recurrent episode     LLQ abdominal pain       PLAN:        Metronidazole 500 mg three times a day for a week    Cipro 500 mg twice a day for a week    No alcohol on this regimen    Follow up with General Surgery (Dr Naranjo) to discuss Colonoscopy and recurrent diverticulitis.    If not improving then will get a ABD CT           "

## 2023-12-13 NOTE — TELEPHONE ENCOUNTER
Discussed with pt smoldering llq pain for the past 3 months.  Today pain is 8/10 and he was unable to work.  Had a Colonoscopy within the past 3 years.  Plan is to see in clinic today, check white count, order CT and start antibiotics if appropriate

## 2023-12-18 ENCOUNTER — OFFICE VISIT (OUTPATIENT)
Dept: AUDIOLOGY | Facility: CLINIC | Age: 73
End: 2023-12-18
Payer: MEDICARE

## 2023-12-18 DIAGNOSIS — H90.3 SENSORINEURAL HEARING LOSS (SNHL) OF BOTH EARS: Primary | ICD-10-CM

## 2023-12-18 PROCEDURE — V5275 EAR IMPRESSION: HCPCS | Mod: RT | Performed by: AUDIOLOGIST

## 2023-12-20 NOTE — PROGRESS NOTES
AUDIOLOGY REPORT    EARMOLD IMPRESSIONS    SUBJECTIVE:Zachariah Wilkerson Jr. is a 73 year old adult who was seen in the Audiology Clinic at the [facility] on 12/18/2023  for an earmold impressions for custom hearing protection.      OBJECTIVE:     Otoscopy revealed clear left ear canal and a very small piece of wax in the right ear deeper in the canal.     Earmold impressions were taken of both ears without incident today.    He would like 2 sets of custom hearing protection. 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    For the filtered plugs for work, he has chosen a concert plug with 15 dB filter in the color clear with a black cord attaching them with his initials and R/L indicators.      For the sleep plugs, he has chosen beige cat eye with his initials on them and R/L indicators.    Billed for 2 earmold impressions today.    Patient signed a Notice of Non-Covered Services form today for his custom hearing protection. SIGNED WAIVER ON FILE    ASSESSMENT: Earmold impressions were completed today.    PLAN:Zachariah will return to be fit with custom hearing protection when they arrive. Please call this clinic with any questions regarding today s appointment.    Yumiko Parham, CCC-A  [place] Licensed Audiologist #[id number]

## 2024-01-19 ENCOUNTER — OFFICE VISIT (OUTPATIENT)
Dept: AUDIOLOGY | Facility: CLINIC | Age: 74
End: 2024-01-19

## 2024-01-19 DIAGNOSIS — H90.3 SENSORINEURAL HEARING LOSS (SNHL) OF BOTH EARS: Primary | ICD-10-CM

## 2024-01-19 PROCEDURE — V5264 EAR MOLD/INSERT: HCPCS | Performed by: AUDIOLOGIST

## 2024-01-19 NOTE — PROGRESS NOTES
AUDIOLOGY REPORT    EARMOLD FITTING - CUSTOM HEARING PROTECTION    SUBJECTIVE:Zachariah Wilkerson Jr. is a 73 year old adult who was seen in the Audiology Clinic at the [facility] on 1/19/2024  for a custom hearing protection fitting.     OBJECTIVE:     Otoscopy revealed clear left ear and moderate wax right ear.    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  Good physical fit. Patient was shown how to insert/remove earmold(s) and how to care for them. Patient did well.      Unfortunately the ear plugs he is going to wear for work came without a cord even though I asked for one. I called [employer] and they stated he can keep the current plugs and they will submit a no-charge remake to add the cord.     I will call patient once his new ear plugs are back so I can fit him with them.    Billed for 2 sets of custom ear plugs.  SIGNED WAIVER ON FILE    ASSESSMENT: Custom ear plug fitting was completed today.     PLAN:Zachariah will return for follow-up as needed,  and I will call him once his new plugs with the cord are in . Please call this clinic with any questions regarding today s appointment.    Dakota Parham., CCC-A  [place] Licensed Audiologist #[id number]

## 2024-01-24 ENCOUNTER — HOSPITAL ENCOUNTER (OUTPATIENT)
Facility: AMBULATORY SURGERY CENTER | Age: 74
End: 2024-01-24
Attending: SURGERY

## 2024-01-24 ENCOUNTER — OFFICE VISIT (OUTPATIENT)
Dept: SURGERY | Facility: CLINIC | Age: 74
End: 2024-01-24
Attending: FAMILY MEDICINE
Payer: MEDICARE

## 2024-01-24 VITALS — DIASTOLIC BLOOD PRESSURE: 64 MMHG | BODY MASS INDEX: 29.59 KG/M2 | WEIGHT: 206.2 LBS | SYSTOLIC BLOOD PRESSURE: 122 MMHG

## 2024-01-24 DIAGNOSIS — K57.32 DIVERTICULITIS OF COLON: ICD-10-CM

## 2024-01-24 PROCEDURE — 99213 OFFICE O/P EST LOW 20 MIN: CPT | Performed by: SURGERY

## 2024-01-24 RX ORDER — ONDANSETRON 2 MG/ML
4 INJECTION INTRAMUSCULAR; INTRAVENOUS
Status: CANCELLED | OUTPATIENT
Start: 2024-01-24

## 2024-01-24 RX ORDER — LIDOCAINE 40 MG/G
CREAM TOPICAL
Status: CANCELLED | OUTPATIENT
Start: 2024-01-24

## 2024-01-24 RX ORDER — KETOROLAC TROMETHAMINE 5 MG/ML
SOLUTION OPHTHALMIC
COMMUNITY
Start: 2023-08-11 | End: 2024-05-20

## 2024-01-24 RX ORDER — BRIMONIDINE TARTRATE 2 MG/ML
1 SOLUTION/ DROPS OPHTHALMIC 2 TIMES DAILY
COMMUNITY
Start: 2023-10-10

## 2024-01-24 NOTE — LETTER
1/24/2024         RE: Zachariah Wilkerson Jr.  [address] [facility] [address]        Dear Colleague,    Thank you for referring your patient, Zachariah Wilkerson Jr., to the [facility] [place]. Please see a copy of my visit note below.    GENERAL SURGICAL CONSULTATION    I was requested by Justen Waller to consult on this pt to evaluate them for diverticulitis    HPI:  This is a 73 year old adult here today with recurrent bouts of diverticulitis.  He had a series of diverticulitis 3 times this last fall.  He eventually got better on oral antibiotics.  After that treatment he did start to feel better.  He has his last attack in Nov 2023.    This series of 3 attacks is unusual but he does commonly have an attack once a year.  He has not had any complications of abscess or fistula.  He has not had to be hospitalized for his diverticulitis.   His last colonoscopy was 8 years ago.       Allergies:Patient has no known allergies.    Past Medical History:   Diagnosis Date     BPH (benign prostatic hypertrophy)      Diverticula, colon      Diverticulosis      DJD (degenerative joint disease) of hip      Gastro-oesophageal reflux disease      GERD (gastroesophageal reflux disease)      Hyperlipidemia      Increased pressure in the eye      Prostate cancer (H)      Restless leg syndrome        Past Surgical History:   Procedure Laterality Date     ARTHROPLASTY MINIMALLY INVASIVE HIP  09/05/2013    Procedure: ARTHROPLASTY MINIMALLY INVASIVE HIP;  Left Total Hip Arthroplasty Minimally Invasive Two Incision;  Surgeon: Justen Hooper MD;  Location: [facility]     ARTHROSCOPY KNEE Left 2010     CATARACT EXTRACTION Bilateral 2018     hand surgeries Bilateral     July 1982, Nov 1982 and July 1983 for tendon repair     LAPAROSCOPIC HERNIORRHAPHY INGUINAL Left 4/21/2022    Procedure: HERNIORRHAPHY, INGUINAL, LAPAROSCOPIC;  Surgeon: Bunny Naranjo MD;  Location: [facility]     LAPAROSCOPIC RETROPUBIC PROSTATECTOMY  09/28/2016     SHOULDER SURGERY Bilateral     both shoulders for impingement, about 2005 right, 2008 for left     [facility] TOTAL HIP ARTHROPLASTY Right     Description: Total Hip Replacement;  Recorded: 06/21/2010;  Comments: May 6, 2010       CURRENT MEDS:  Current Outpatient Medications   Medication Sig Dispense Refill     brimonidine (ALPHAGAN) 0.2 % ophthalmic solution Place 1 drop into both eyes 2 times daily       ketorolac (ACULAR) 0.5 % ophthalmic solution        acetaminophen (TYLENOL) 325 MG tablet Take 2 tablets (650 mg) by mouth every 4 hours as needed for mild pain 50 tablet 0     ibuprofen (ADVIL/MOTRIN) 600 MG tablet Take 1 tablet (600 mg) by mouth every 6 hours as needed for other (mild and/or inflammatory pain) 30 tablet 0     latanoprost (XALATAN) 0.005 % ophthalmic solution Place 1 drop into both eyes At Bedtime       omeprazole (PRILOSEC) 20 MG DR capsule TAKE 2 CAPSULES BY MOUTH DAILY BEFORE AND BREAKFAST 180 capsule 3     timolol maleate (TIMOPTIC) 0.5 % ophthalmic solution PLACE 1 DROP IN BOTH EYES EVERY MORNING         Family History   Problem Relation Age of Onset     Dementia Mother      Cerebrovascular Disease Father      Crohn's Disease Father      Prostate Cancer Father      Lung Cancer Sister      No Known Problems Son      Prostate Cancer Paternal Uncle      No Known Problems Sister      No Known Problems Son      No Known Problems Son      No Known Problems Son      Prostate Cancer Paternal Uncle      Family history is not pertinent to this patients Chief Complaint.     reports that Zachariah Wilkerson Jr. has never smoked. Zachariah Wilkerson Jr. has never used smokeless tobacco. Zachariah Wilkerson Jr. reports current alcohol use. Zachariah Wilkerson Jr. reports that Zachariah Wilkerson Jr. does not use drugs.    Review of Systems -   10 point Review of systems is negative except for; as mentioned above in HPI and PMHx    /64 (BP Location: Right arm,  Patient Position: Sitting, Cuff Size: Adult Small)   Wt 93.5 kg (206 lb 3.2 oz)   BMI 29.59 kg/m    Body mass index is 29.59 kg/m .    EXAM:  GENERAL: Well developed adult  HEENT: EOMI, Anicteric Sclera, Moist Mucous Membranes,  In Mouth the pt does not have redness or bleeding gums  CARDIOVASCULAR: RRR w/out murmur   CHEST/LUNG: Clear to Auscultation  ABDOMEN:  Non tender to palpation, +BS  MUSCULOSKELETAL:  No deformities with good range of motion in all extremities  NEURO: Zachariah Wilkerson Jr. is ambulatory with good strength in both legs.  HEME/LYMPH: No Cervical Adenopathy or tenderness.     IMAGES:  none    Assessment/Plan:  Recurrent Diverticulitis but no sever complications so far.  I would reccomed updating his colonoscopy and making decisions about the need for surgery or not based on those findings.     Colonoscopy.      Bunny Naranjo MD  [facility]  [phone]       Again, thank you for allowing me to participate in the care of your patient.        Sincerely,        Bunny Naranjo MD

## 2024-01-24 NOTE — PROGRESS NOTES
GENERAL SURGICAL CONSULTATION    I was requested by Justen Waller to consult on this pt to evaluate them for diverticulitis    HPI:  This is a 73 year old adult here today with recurrent bouts of diverticulitis.  He had a series of diverticulitis 3 times this last fall.  He eventually got better on oral antibiotics.  After that treatment he did start to feel better.  He has his last attack in Nov 2023.    This series of 3 attacks is unusual but he does commonly have an attack once a year.  He has not had any complications of abscess or fistula.  He has not had to be hospitalized for his diverticulitis.   His last colonoscopy was 8 years ago.       Allergies:Patient has no known allergies.    Past Medical History:   Diagnosis Date    BPH (benign prostatic hypertrophy)     Diverticula, colon     Diverticulosis     DJD (degenerative joint disease) of hip     Gastro-oesophageal reflux disease     GERD (gastroesophageal reflux disease)     Hyperlipidemia     Increased pressure in the eye     Prostate cancer (H)     Restless leg syndrome        Past Surgical History:   Procedure Laterality Date    ARTHROPLASTY MINIMALLY INVASIVE HIP  09/05/2013    Procedure: ARTHROPLASTY MINIMALLY INVASIVE HIP;  Left Total Hip Arthroplasty Minimally Invasive Two Incision;  Surgeon: Justen Hooper MD;  Location: [facility]    ARTHROSCOPY KNEE Left 2010    CATARACT EXTRACTION Bilateral 2018    hand surgeries Bilateral     July 1982, Nov 1982 and July 1983 for tendon repair    LAPAROSCOPIC HERNIORRHAPHY INGUINAL Left 4/21/2022    Procedure: HERNIORRHAPHY, INGUINAL, LAPAROSCOPIC;  Surgeon: Bunny Naranjo MD;  Location: [facility]    LAPAROSCOPIC RETROPUBIC PROSTATECTOMY  09/28/2016    SHOULDER SURGERY Bilateral     both shoulders for impingement, about 2005 right, 2008 for left    [facility] TOTAL HIP ARTHROPLASTY Right     Description: Total Hip Replacement;  Recorded: 06/21/2010;  Comments: May 6, 2010       CURRENT  MEDS:  Current Outpatient Medications   Medication Sig Dispense Refill    brimonidine (ALPHAGAN) 0.2 % ophthalmic solution Place 1 drop into both eyes 2 times daily      ketorolac (ACULAR) 0.5 % ophthalmic solution       acetaminophen (TYLENOL) 325 MG tablet Take 2 tablets (650 mg) by mouth every 4 hours as needed for mild pain 50 tablet 0    ibuprofen (ADVIL/MOTRIN) 600 MG tablet Take 1 tablet (600 mg) by mouth every 6 hours as needed for other (mild and/or inflammatory pain) 30 tablet 0    latanoprost (XALATAN) 0.005 % ophthalmic solution Place 1 drop into both eyes At Bedtime      omeprazole (PRILOSEC) 20 MG DR capsule TAKE 2 CAPSULES BY MOUTH DAILY BEFORE AND BREAKFAST 180 capsule 3    timolol maleate (TIMOPTIC) 0.5 % ophthalmic solution PLACE 1 DROP IN BOTH EYES EVERY MORNING         Family History   Problem Relation Age of Onset    Dementia Mother     Cerebrovascular Disease Father     Crohn's Disease Father     Prostate Cancer Father     Lung Cancer Sister     No Known Problems Son     Prostate Cancer Paternal Uncle     No Known Problems Sister     No Known Problems Son     No Known Problems Son     No Known Problems Son     Prostate Cancer Paternal Uncle      Family history is not pertinent to this patients Chief Complaint.     reports that Zachariahhu Wilkerson Jr. has never smoked. Zachariah Wilkerson Jr. has never used smokeless tobacco. Zachariah Wilkerson Jr. reports current alcohol use. Zachariah Wilkerson Jr. reports that Zachariah Dianet Ambrocio does not use drugs.    Review of Systems -   10 point Review of systems is negative except for; as mentioned above in HPI and PMHx    /64 (BP Location: Right arm, Patient Position: Sitting, Cuff Size: Adult Small)   Wt 93.5 kg (206 lb 3.2 oz)   BMI 29.59 kg/m    Body mass index is 29.59 kg/m .    EXAM:  GENERAL: Well developed adult  HEENT: EOMI, Anicteric Sclera, Moist Mucous Membranes,  In Mouth the pt does not have redness or bleeding gums  CARDIOVASCULAR: RRR w/out murmur    CHEST/LUNG: Clear to Auscultation  ABDOMEN:  Non tender to palpation, +BS  MUSCULOSKELETAL:  No deformities with good range of motion in all extremities  NEURO: Zachariah FABIOLA Wilkerson Jr. is ambulatory with good strength in both legs.  HEME/LYMPH: No Cervical Adenopathy or tenderness.     IMAGES:  none    Assessment/Plan:  Recurrent Diverticulitis but no sever complications so far.  I would reccomed updating his colonoscopy and making decisions about the need for surgery or not based on those findings.     Colonoscopy.      Bunny Naranjo MD  [facility]  [phone]

## 2024-02-01 ENCOUNTER — TELEPHONE (OUTPATIENT)
Dept: AUDIOLOGY | Facility: CLINIC | Age: 74
End: 2024-02-01
Payer: MEDICARE

## 2024-02-01 NOTE — TELEPHONE ENCOUNTER
Spoke to patient and let him know his new custom earmold with the cord have arrived and he can stop by during walk in hours to pick them up. He is happy with this and will let me know if there are any issues. He has been using both of his custom earplugs and states that they are working well so far. This extra pair with the cord is a new pair at no charge because Chad made a mistake and made his first pair without the cord.  He will let me know if there are any issues.    Yumiko Parham, CCC-A  [place] Licensed Audiologist #[id number]

## 2024-03-28 ENCOUNTER — TRANSFERRED RECORDS (OUTPATIENT)
Dept: HEALTH INFORMATION MANAGEMENT | Facility: CLINIC | Age: 74
End: 2024-03-28

## 2024-04-02 ENCOUNTER — TELEPHONE (OUTPATIENT)
Dept: SURGERY | Facility: CLINIC | Age: 74
End: 2024-04-02

## 2024-04-02 NOTE — TELEPHONE ENCOUNTER
Patient called me, needing to reschedule his colonoscopy with Dr. Naranjo due to a flare up of his diverticulitis. He's now rescheduled for May 23rd. We went over details and I let him know I will send an updated letter/prep instructions to his MyChart. He's in agreement with the plan.

## 2024-05-20 VITALS — BODY MASS INDEX: 26.4 KG/M2 | WEIGHT: 184 LBS

## 2024-05-22 ENCOUNTER — TELEPHONE (OUTPATIENT)
Dept: SURGERY | Facility: CLINIC | Age: 74
End: 2024-05-22

## 2024-05-22 RX ORDER — SODIUM CHLORIDE, SODIUM LACTATE, POTASSIUM CHLORIDE, CALCIUM CHLORIDE 600; 310; 30; 20 MG/100ML; MG/100ML; MG/100ML; MG/100ML
INJECTION, SOLUTION INTRAVENOUS CONTINUOUS
Status: CANCELLED | OUTPATIENT
Start: 2024-05-22

## 2024-05-22 RX ORDER — LIDOCAINE 40 MG/G
CREAM TOPICAL
Status: CANCELLED | OUTPATIENT
Start: 2024-05-22

## 2024-05-22 NOTE — TELEPHONE ENCOUNTER
Patient called and left me a message that he needs to cancel his colonoscopy for 05.23.24. He will call back to reschedule.

## 2024-06-02 ENCOUNTER — HEALTH MAINTENANCE LETTER (OUTPATIENT)
Age: 74
End: 2024-06-02

## 2025-05-05 NOTE — TELEPHONE ENCOUNTER
Patient had surgery a few weeks ago still in a lot of pain and swelling, wants to know if this is normal.  He is supposed to go back to work tomorrow but can't get pants on, low energy and only comfortable laying flat.    Please call and advise.    Thanks!         no

## 2025-05-06 ENCOUNTER — VIRTUAL VISIT (OUTPATIENT)
Dept: FAMILY MEDICINE | Facility: CLINIC | Age: 75
End: 2025-05-06

## 2025-05-06 DIAGNOSIS — R05.2 SUBACUTE COUGH: ICD-10-CM

## 2025-05-06 DIAGNOSIS — J01.10 SUBACUTE FRONTAL SINUSITIS: Primary | ICD-10-CM

## 2025-05-06 PROBLEM — K76.89 HEPATIC CYST: Status: ACTIVE | Noted: 2025-05-06

## 2025-05-06 PROBLEM — D62 ACUTE POSTHEMORRHAGIC ANEMIA: Status: ACTIVE | Noted: 2025-05-06

## 2025-05-06 PROBLEM — G47.33 OSA ON CPAP: Status: ACTIVE | Noted: 2017-02-22

## 2025-05-06 PROBLEM — K31.7 POLYP OF DUODENUM: Status: ACTIVE | Noted: 2022-08-08

## 2025-05-06 PROBLEM — K44.9 HIATAL HERNIA: Status: ACTIVE | Noted: 2022-08-08

## 2025-05-06 PROBLEM — N40.0 BENIGN PROSTATIC HYPERPLASIA: Status: ACTIVE | Noted: 2025-05-06

## 2025-05-06 PROBLEM — C61 PROSTATE CANCER (H): Status: ACTIVE | Noted: 2025-05-06

## 2025-05-06 PROBLEM — K21.00 CHRONIC REFLUX ESOPHAGITIS: Status: ACTIVE | Noted: 2025-05-06

## 2025-05-06 PROBLEM — I86.1 SCROTAL VARICES: Status: ACTIVE | Noted: 2025-05-06

## 2025-05-06 PROCEDURE — 98005 SYNCH AUDIO-VIDEO EST LOW 20: CPT

## 2025-05-06 RX ORDER — BENZONATATE 200 MG/1
200 CAPSULE ORAL 3 TIMES DAILY PRN
Qty: 20 CAPSULE | Refills: 0 | Status: SHIPPED | OUTPATIENT
Start: 2025-05-06

## 2025-05-06 RX ORDER — TIMOLOL MALEATE 5 MG/ML
1 SOLUTION/ DROPS OPHTHALMIC EVERY MORNING
COMMUNITY
Start: 2025-03-17

## 2025-05-06 NOTE — PROGRESS NOTES
Zachariah is a 74 year old who is being evaluated via a billable video visit.    How would you like to obtain your AVS? Mail a copy  If the video visit is dropped, the invitation should be resent by: Text to cell phone: [phone]  Will anyone else be joining your video visit? No    8 min 30 seconds  Assessment & Plan     Subacute frontal sinusitis  Given duration of symptoms with co-occurring congested productive cough, reasonable to treat for sinusitis.   - amoxicillin-clavulanate (AUGMENTIN) 875-125 MG tablet; Take 1 tablet by mouth 2 times daily for 5 days.    Subacute cough  Cough suppression.  - benzonatate (TESSALON) 200 MG capsule; Take 1 capsule (200 mg) by mouth 3 times daily as needed for cough.    Patient Instructions   Plan:  - augmentin twice a day x 5 days - probiotic either mid day or when antibiotic complete, can be hard on the stomach  - tessalon for cough suppression, particularly at night  - nasal rinses as needed to maintain moisture and flush sinuses  - zyrtec and flonase allergy symptoms and post-nasal drip      Subjective   Zachariah is a 74 year old, presenting for the following health issues:  Cough        5/6/2025     1:11 PM   Additional Questions   Roomed by Sylvia SHAIKH      Acute Illness  Acute illness concerns: Cough  Onset/Duration: 1 month  Symptoms:  Fever: No  Chills/Sweats: No  Headache (location?): No  Sinus Pressure: YES  Conjunctivitis:  No  Ear Pain: no  Rhinorrhea: YES  Congestion: YES- chest congestion  Sore Throat: YES  Cough: YES-productive of green sputum, waxing and waning over time  Wheeze: No  Decreased Appetite: No  Nausea: No  Vomiting: No  Diarrhea: No  Dysuria/Freq.: No  Dysuria or Hematuria: No  Fatigue/Achiness: No  Sick/Strep Exposure: YES- grandson had a cold, no known diagnosis  Therapies tried and outcome: zyrtec -  didn't help    Never felt very sick, cough feels like a bad cold and has settled in his chest. Notes triggers for coughing include post nasal drip.  Endorses history of allergies, tried zyrtec for 2 days. Notes some facial/sinus pressure but much improved, but with persistent drainage Notes lots of coughing at night. Appetite preserved. Denies fevers, chills, wheezing, and chest pain.         Review of Systems  Constitutional, HEENT, cardiovascular, pulmonary, gi and gu systems are negative, except as otherwise noted.      Objective       Vitals:  No vitals were obtained today due to virtual visit.    Physical Exam   GENERAL: alert and no distress  EYES: Eyes grossly normal to inspection.  No discharge or erythema, or obvious scleral/conjunctival abnormalities.  RESP: No audible wheeze, cough, or visible cyanosis.    SKIN: Visible skin clear. No significant rash, abnormal pigmentation or lesions.  NEURO: Cranial nerves grossly intact.  Mentation and speech appropriate for age.  PSYCH: Appropriate affect, tone, and pace of words      Video-Visit Details  Type of service:  Video Visit   Originating Location (pt. Location): Home    Distant Location (provider location):  On-site  Platform used for Video Visit: Tamara  Signed Electronically by: JAQUELINE Castaneda CNP

## 2025-05-06 NOTE — PATIENT INSTRUCTIONS
Plan:  - augmentin twice a day x 5 days - probiotic either mid day or when antibiotic complete, can be hard on the stomach  - tessalon for cough suppression, particularly at night  - nasal rinses as needed to maintain moisture and flush sinuses  - zyrtec and flonase allergy symptoms and post-nasal drip

## 2025-06-15 ENCOUNTER — HEALTH MAINTENANCE LETTER (OUTPATIENT)
Age: 75
End: 2025-06-15